# Patient Record
Sex: FEMALE | Race: WHITE | Employment: OTHER | URBAN - METROPOLITAN AREA
[De-identification: names, ages, dates, MRNs, and addresses within clinical notes are randomized per-mention and may not be internally consistent; named-entity substitution may affect disease eponyms.]

---

## 2017-01-26 ENCOUNTER — NURSE ONLY (OUTPATIENT)
Dept: INTERNAL MEDICINE CLINIC | Facility: CLINIC | Age: 82
End: 2017-01-26

## 2017-01-26 DIAGNOSIS — I48.91 ATRIAL FIBRILLATION, UNSPECIFIED TYPE (HCC): Primary | ICD-10-CM

## 2017-01-26 LAB — INR: 2.4 (ref 0.8–1.2)

## 2017-01-26 PROCEDURE — 85610 PROTHROMBIN TIME: CPT | Performed by: INTERNAL MEDICINE

## 2017-02-13 ENCOUNTER — TELEPHONE (OUTPATIENT)
Dept: INTERNAL MEDICINE CLINIC | Facility: CLINIC | Age: 82
End: 2017-02-13

## 2017-02-13 ENCOUNTER — OFFICE VISIT (OUTPATIENT)
Dept: INTERNAL MEDICINE CLINIC | Facility: CLINIC | Age: 82
End: 2017-02-13

## 2017-02-13 VITALS
HEART RATE: 82 BPM | SYSTOLIC BLOOD PRESSURE: 140 MMHG | RESPIRATION RATE: 16 BRPM | HEIGHT: 63.15 IN | DIASTOLIC BLOOD PRESSURE: 70 MMHG | WEIGHT: 124.69 LBS | BODY MASS INDEX: 22.09 KG/M2 | OXYGEN SATURATION: 97 %

## 2017-02-13 DIAGNOSIS — I10 ESSENTIAL HYPERTENSION, BENIGN: ICD-10-CM

## 2017-02-13 DIAGNOSIS — I48.91 ATRIAL FIBRILLATION, UNSPECIFIED TYPE (HCC): ICD-10-CM

## 2017-02-13 DIAGNOSIS — R20.2 RIGHT HAND PARESTHESIA: Primary | ICD-10-CM

## 2017-02-13 PROCEDURE — 99213 OFFICE O/P EST LOW 20 MIN: CPT | Performed by: INTERNAL MEDICINE

## 2017-02-13 NOTE — PROGRESS NOTES
Patient received a phone call from hospital nurse is a follow-up call. Patient complains of tingling right hand. Advised her to make an appointment. Patient states she has intermittent tingling only when she moves fingers.   Denies any weakness in the ha

## 2017-02-14 ENCOUNTER — TELEPHONE (OUTPATIENT)
Dept: INTERNAL MEDICINE CLINIC | Facility: CLINIC | Age: 82
End: 2017-02-14
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
## 2017-02-14 NOTE — PROGRESS NOTES
Son give us a call I did update them on mom's office visit. Did recommend she follow-up with her primary care physician.

## 2017-04-26 ENCOUNTER — NURSE ONLY (OUTPATIENT)
Dept: INTERNAL MEDICINE CLINIC | Facility: CLINIC | Age: 82
End: 2017-04-26

## 2017-04-26 DIAGNOSIS — I48.91 ATRIAL FIBRILLATION, UNSPECIFIED TYPE (HCC): Primary | ICD-10-CM

## 2017-04-26 PROCEDURE — 85610 PROTHROMBIN TIME: CPT | Performed by: INTERNAL MEDICINE

## 2017-07-26 ENCOUNTER — NURSE ONLY (OUTPATIENT)
Dept: INTERNAL MEDICINE CLINIC | Facility: CLINIC | Age: 82
End: 2017-07-26

## 2017-07-26 DIAGNOSIS — I48.91 ATRIAL FIBRILLATION, UNSPECIFIED TYPE (HCC): Primary | ICD-10-CM

## 2017-07-26 LAB — INR: 2.7 (ref 0.8–1.2)

## 2017-07-26 PROCEDURE — 85610 PROTHROMBIN TIME: CPT | Performed by: INTERNAL MEDICINE

## 2017-09-12 ENCOUNTER — NURSE ONLY (OUTPATIENT)
Dept: INTERNAL MEDICINE CLINIC | Facility: CLINIC | Age: 82
End: 2017-09-12

## 2017-09-12 DIAGNOSIS — I48.91 ATRIAL FIBRILLATION, UNSPECIFIED TYPE (HCC): Primary | ICD-10-CM

## 2017-09-12 LAB — INR: 2.5 (ref 0.8–1.2)

## 2017-09-12 PROCEDURE — 85610 PROTHROMBIN TIME: CPT | Performed by: INTERNAL MEDICINE

## 2017-10-03 ENCOUNTER — HOSPITAL ENCOUNTER (OUTPATIENT)
Facility: HOSPITAL | Age: 82
Setting detail: OBSERVATION
Discharge: HOME HEALTH | End: 2017-10-05
Attending: EMERGENCY MEDICINE | Admitting: HOSPITALIST
Payer: MEDICARE

## 2017-10-03 ENCOUNTER — APPOINTMENT (OUTPATIENT)
Dept: GENERAL RADIOLOGY | Facility: HOSPITAL | Age: 82
End: 2017-10-03
Payer: MEDICARE

## 2017-10-03 DIAGNOSIS — R07.9 ACUTE CHEST PAIN: Primary | ICD-10-CM

## 2017-10-03 DIAGNOSIS — I48.20 CHRONIC ATRIAL FIBRILLATION (HCC): ICD-10-CM

## 2017-10-03 PROCEDURE — 71020 XR CHEST PA + LAT CHEST (CPT=71020): CPT | Performed by: EMERGENCY MEDICINE

## 2017-10-03 PROCEDURE — 71020 XR CHEST PA + LAT CHEST (CPT=71020): CPT

## 2017-10-03 RX ORDER — SODIUM CHLORIDE 9 MG/ML
INJECTION, SOLUTION INTRAVENOUS CONTINUOUS
Status: CANCELLED | OUTPATIENT
Start: 2017-10-03 | End: 2017-10-04

## 2017-10-03 RX ORDER — IPRATROPIUM BROMIDE AND ALBUTEROL SULFATE 2.5; .5 MG/3ML; MG/3ML
3 SOLUTION RESPIRATORY (INHALATION) ONCE
Status: COMPLETED | OUTPATIENT
Start: 2017-10-03 | End: 2017-10-03

## 2017-10-03 RX ORDER — ASPIRIN 81 MG/1
324 TABLET, CHEWABLE ORAL ONCE
Status: COMPLETED | OUTPATIENT
Start: 2017-10-03 | End: 2017-10-03

## 2017-10-04 PROCEDURE — 99220 INITIAL OBSERVATION CARE,LEVL III: CPT | Performed by: HOSPITALIST

## 2017-10-04 RX ORDER — DOCUSATE SODIUM 100 MG/1
100 CAPSULE, LIQUID FILLED ORAL 2 TIMES DAILY
Status: DISCONTINUED | OUTPATIENT
Start: 2017-10-04 | End: 2017-10-05

## 2017-10-04 RX ORDER — WARFARIN SODIUM 2.5 MG/1
2.5 TABLET ORAL NIGHTLY
Status: DISCONTINUED | OUTPATIENT
Start: 2017-10-05 | End: 2017-10-04

## 2017-10-04 RX ORDER — NITROGLYCERIN 0.4 MG/1
0.4 TABLET SUBLINGUAL EVERY 5 MIN PRN
Status: DISCONTINUED | OUTPATIENT
Start: 2017-10-04 | End: 2017-10-05

## 2017-10-04 RX ORDER — BISACODYL 10 MG
10 SUPPOSITORY, RECTAL RECTAL
Status: DISCONTINUED | OUTPATIENT
Start: 2017-10-04 | End: 2017-10-05

## 2017-10-04 RX ORDER — WARFARIN SODIUM 2.5 MG/1
2.5 TABLET ORAL NIGHTLY
Status: DISCONTINUED | OUTPATIENT
Start: 2017-10-04 | End: 2017-10-04

## 2017-10-04 RX ORDER — POTASSIUM CHLORIDE 20 MEQ/1
40 TABLET, EXTENDED RELEASE ORAL EVERY 4 HOURS
Status: COMPLETED | OUTPATIENT
Start: 2017-10-04 | End: 2017-10-04

## 2017-10-04 RX ORDER — FUROSEMIDE 10 MG/ML
20 INJECTION INTRAMUSCULAR; INTRAVENOUS ONCE
Status: COMPLETED | OUTPATIENT
Start: 2017-10-04 | End: 2017-10-04

## 2017-10-04 RX ORDER — TORSEMIDE 20 MG/1
20 TABLET ORAL DAILY
Status: DISCONTINUED | OUTPATIENT
Start: 2017-10-05 | End: 2017-10-05

## 2017-10-04 RX ORDER — HYDROCODONE BITARTRATE AND ACETAMINOPHEN 5; 325 MG/1; MG/1
2 TABLET ORAL EVERY 4 HOURS PRN
Status: DISCONTINUED | OUTPATIENT
Start: 2017-10-04 | End: 2017-10-05

## 2017-10-04 RX ORDER — CODEINE PHOSPHATE AND GUAIFENESIN 10; 100 MG/5ML; MG/5ML
5 SOLUTION ORAL EVERY 4 HOURS PRN
Status: DISCONTINUED | OUTPATIENT
Start: 2017-10-04 | End: 2017-10-05

## 2017-10-04 RX ORDER — WARFARIN SODIUM 2.5 MG/1
2.5 TABLET ORAL NIGHTLY
Status: DISCONTINUED | OUTPATIENT
Start: 2017-10-04 | End: 2017-10-05

## 2017-10-04 RX ORDER — FUROSEMIDE 10 MG/ML
20 INJECTION INTRAMUSCULAR; INTRAVENOUS DAILY
Status: DISCONTINUED | OUTPATIENT
Start: 2017-10-05 | End: 2017-10-04

## 2017-10-04 RX ORDER — WARFARIN SODIUM 5 MG/1
5 TABLET ORAL ONCE
Status: COMPLETED | OUTPATIENT
Start: 2017-10-04 | End: 2017-10-04

## 2017-10-04 RX ORDER — ENOXAPARIN SODIUM 100 MG/ML
1 INJECTION SUBCUTANEOUS DAILY
Status: DISCONTINUED | OUTPATIENT
Start: 2017-10-04 | End: 2017-10-05

## 2017-10-04 RX ORDER — CARVEDILOL 6.25 MG/1
6.25 TABLET ORAL DAILY
Status: DISCONTINUED | OUTPATIENT
Start: 2017-10-04 | End: 2017-10-05

## 2017-10-04 RX ORDER — ENOXAPARIN SODIUM 100 MG/ML
30 INJECTION SUBCUTANEOUS DAILY
Status: DISCONTINUED | OUTPATIENT
Start: 2017-10-04 | End: 2017-10-04

## 2017-10-04 RX ORDER — ONDANSETRON 2 MG/ML
4 INJECTION INTRAMUSCULAR; INTRAVENOUS EVERY 6 HOURS PRN
Status: DISCONTINUED | OUTPATIENT
Start: 2017-10-04 | End: 2017-10-05

## 2017-10-04 RX ORDER — HYDROCODONE BITARTRATE AND ACETAMINOPHEN 5; 325 MG/1; MG/1
1 TABLET ORAL EVERY 4 HOURS PRN
Status: DISCONTINUED | OUTPATIENT
Start: 2017-10-04 | End: 2017-10-05

## 2017-10-04 RX ORDER — ACETAMINOPHEN 325 MG/1
650 TABLET ORAL EVERY 4 HOURS PRN
Status: DISCONTINUED | OUTPATIENT
Start: 2017-10-04 | End: 2017-10-05

## 2017-10-04 RX ORDER — POLYETHYLENE GLYCOL 3350 17 G/17G
17 POWDER, FOR SOLUTION ORAL DAILY PRN
Status: DISCONTINUED | OUTPATIENT
Start: 2017-10-04 | End: 2017-10-05

## 2017-10-04 RX ORDER — SODIUM PHOSPHATE, DIBASIC AND SODIUM PHOSPHATE, MONOBASIC 7; 19 G/133ML; G/133ML
1 ENEMA RECTAL ONCE AS NEEDED
Status: DISCONTINUED | OUTPATIENT
Start: 2017-10-04 | End: 2017-10-05

## 2017-10-04 NOTE — H&P
[facility]  History and Physical     Teri Mouse Patient Status:  Observation    3/18/1926 MRN [id number]   [facility] Att[facility] Jona Flynn MD   Hosp Day # 0 PCP Bola Espinal MD     Chief Complaint: Chest pain Current Outpatient Prescriptions on File Prior to Encounter:  Warfarin Sodium 5 MG Oral Tab Take 5 mg by mouth twice a week. Disp:  Rfl:    carvedilol (COREG) 6.25 MG Oral Tab Take 6.25 mg by mouth daily.    Disp:  Rfl: 0   torsemide (DEMADEX) 20 MG Oral Imaging: Imaging data reviewed in Epic. ASSESSMENT / PLAN:     1. Acute on chronic diastolic heart failure   1. IV lasix in am   2. Cardiology  3. Hold on echo for now  2. Afib, hx CVA  1.  Coumadin   2. inr 1.3, lovenox , 5mg coumadin tonight

## 2017-10-04 NOTE — PHYSICAL THERAPY NOTE
PHYSICAL THERAPY QUICK EVALUATION - INPATIENT    Room Number: 1326/4027-M  Evaluation Date: 10/4/2017  Presenting Problem: Chest Pain  Physician Order: PT Eval and Treat    Problem List  Principal Problem:    Acute chest pain      Past Medical History  P Women: 10   70-72 y/o Men: 15, Women: 7   73-73 y/o Men: 6, Women: 5   80-79 y/o Men: 8, Women: 5   80-79 y/o Men: 6, Women: 8   80-79 y/o Men 7, Women: 4]    4 Item Dynamic Gait Index Score: 9/12 Fall Risk: yes  [Scores of 10 or less indicate increase recovers, can continue to walk. (0)  Severe Impairment: Preform task with severe disruption of gait, i.e., staggers outside 15” path, loses balance, stops, reaches for wall.     4. Gait with vertical head turns: 2  Grading: Drew the lowest category that ap Provided: The pt was approached for therapy lying supine in bed. Pt completed supine>sit to EOB with independence. Pt completed sit>stand with independence. Pt ambulated 300 feet with SBA, no loss of balance noted but pt does exhibited notable drifting. ...    Patient was able to transfer Safely and independently   Patient able to ambulate on level surfaces At previous, functional level

## 2017-10-04 NOTE — PROGRESS NOTES
Addendum  Patient seen and examined  Chest: diminished in bases B/L  CVS: S1, S2, RRR  ABD: Soft, NT, ND, BS+  EXT: No c/c    Imaging: Reviewed  Agree with above  Start abx for UTI  Cont diuresis  Increased coumadin  INR in AM  DC planning hopefully tomorr

## 2017-10-04 NOTE — ED PROVIDER NOTES
Patient Seen in: [facility] Emergency Department    History   Patient presents with:  Chest Pain Angina (cardiovascular)    Stated Complaint: cp     HPI    80-year-old female complaining of chest pain the patient's had some intermittent chest pain t Exam    The patient's alert and oriented ×3 no acute distress HEENT exam within normal limits neck is no lymphadenopathy or JVD lungs there is a slight expiratory rhonchi cardiovascular exam shows irregularly irregular rhythm without murmurs  Abdomen is so -----------         ------                     CBC W/ DIFFERENTIAL[[id number]]          Abnormal            Final result                 Please view results for these tests on the individual orders.      EKG    Rate, intervals and

## 2017-10-04 NOTE — PLAN OF CARE
CARDIOVASCULAR - ADULT    • Maintains optimal cardiac output and hemodynamic stability Progressing    • Absence of cardiac arrhythmias or at baseline Progressing                Assumed pt care at 0730. Alert, awake, very forgetful.  Breathing unlabored at

## 2017-10-04 NOTE — HISTORICAL OFFICE NOTE
Ameena Fredas  [phone]  : 1926  ACCOUNT:  [id number]  PCP: Dr. Manzanares Dorosemary: Shad Bhandari:  2015    DISCHARGE SUMMARY    HOSPITAL COURSE: Mrs. Cassia Arellano is an 24-year-old female who lives at [facility] and is follow

## 2017-10-04 NOTE — CONSULTS
[facility]  Report of Consultation    Tanvir Sanabria Patient Status:  Observation    3/18/1926 MRN [id number]   [facility] 2NE-A Attending Og Renee MD   Hosp Day # 0 PCP Heather Javier MD     Reason for Consultation:  Chest pain Right ventricle: The cavity size was normal. Systolic function was     normal.  5. Right atrium: The atrium was massively dilated. 6. Tricuspid valve: There was mild-moderate regurgitation.   7. Pulmonary arteries: Systolic pressure was mildly elevated est powder packet 17 g, 17 g, Oral, Daily PRN  •  bisacodyl (DULCOLAX) rectal suppository 10 mg, 10 mg, Rectal, Daily PRN  •  FLEET ENEMA (FLEET) 7-19 GM/118ML enema 133 mL, 1 enema, Rectal, Once PRN  •  [START ON 10/5/2017] Warfarin Sodium (COUMADIN) tab 2.5 pulmonary vascularity. Tortuous and calcified aorta. Perihilar and upper lobe interstitial opacities are greater on the right and not significantly changed when allowing for technical differences and therefore is consistent with fibrosis.  No new pulmonary dose of coumadin today, not necessary bridging due to h/o GI bleed 2015 and her advanced age  - may give empirically one dose of IV Lasix then usual oral torsemide dose  - continue Coreg  - PT eval - uses a walker PRN - occasional dizziness  - DNR  - obser

## 2017-10-04 NOTE — CM/SW NOTE
10/04/17 1430   CM/SW Referral Data   Referral Source Physician   Reason for Referral Discharge planning   Informant Patient   Pertinent Medical Hx   Primary Care Physician Name Dr Gay Quiñones   Patient Info   Patient's Mental Status Alert;Oriented;Memory Impa

## 2017-10-04 NOTE — PLAN OF CARE
NURSING ADMISSION NOTE      Patient admitted via stretcher    Oriented to room. Safety precautions initiated. Bed in low position. Call light in reach. Pt received from ER axox4, forgetful, poor historian. Denies chest pain or sob.  Med rec complete

## 2017-10-04 NOTE — ED NOTES
Assumed care of patient from [facility]. Pt resting comfortably, denies c/o at this time.  NAD

## 2017-10-04 NOTE — PROGRESS NOTES
[facility] Note:  Renal Dose Adjustment for Enoxaparin (LOVENOX)    Tanvir Sanabria has been prescribed Enoxaparin (LOVENOX) 40 mg subcutaneously every 24 hours. Estimated Creatinine Clearance: 23 mL/min (based on SCr of 1.21 mg/dL (H)).     Her calcul

## 2017-10-05 VITALS
RESPIRATION RATE: 18 BRPM | WEIGHT: 104.94 LBS | SYSTOLIC BLOOD PRESSURE: 98 MMHG | TEMPERATURE: 98 F | HEART RATE: 60 BPM | BODY MASS INDEX: 19 KG/M2 | DIASTOLIC BLOOD PRESSURE: 30 MMHG | OXYGEN SATURATION: 92 %

## 2017-10-05 PROCEDURE — 99217 OBSERVATION CARE DISCHARGE: CPT | Performed by: HOSPITALIST

## 2017-10-05 RX ORDER — CEFUROXIME AXETIL 250 MG/1
250 TABLET ORAL 2 TIMES DAILY
Qty: 6 TABLET | Refills: 0 | Status: SHIPPED | OUTPATIENT
Start: 2017-10-05 | End: 2017-10-08

## 2017-10-05 NOTE — OCCUPATIONAL THERAPY NOTE
IP OT attempt to see patient for therapeutic treatment. RN Simón Nickerson) states that patient has been DC from [address] [place] [address], is dressed to leave and is waiting to be picked up. Pt DC from IP OT services at this time.     TEVIN Price, OTR/L  Master of Occupational T

## 2017-10-05 NOTE — CM/SW NOTE
SW spoke with pt's son regarding d/c plan. Pt's son requested referral to [address] and stated he arranged for caregiver services thru the same agency. [facility] referral sent. Pt will d/c today.

## 2017-10-05 NOTE — DISCHARGE SUMMARY
[facility]  DISCHARGE SUMMARY     Etowah Medico Patient Status:  Observation    3/18/1926 MRN [id number]   [facility] 2NE-A Attending Marychuy Banuelos MD   Hosp Day # 0 PCP Zonia Mehta MD     Date of Admission: 10/3/2017  Date of cardiology and discharged in stable condition.     Procedures during hospitalization:   • None     Incidental or significant findings and recommendations (brief descriptions):  • Per Brief Synopsis of Hospital Course    Lab/Test results pending at Discharge No murmurs, rubs or gallops. Abdomen: Soft, nontender, nondistended. Positive bowel sounds. No rebound or guarding. Extremities: No edema.   -----------------------------------------------------------------------------------------------  PATIENT DISCHAR

## 2017-10-05 NOTE — PROGRESS NOTES
[facility]  Cardiology Progress Note    Lige Paci Patient Status:  Observation    3/18/1926 MRN [id number]   [facility] 2NE-A Attending Elian Clay MD   Hosp Day # 0 PCP Lisa Saldivar MD     Subjective:  She really wants to go sodium  100 mg Oral BID   • enoxaparin  1 mg/kg Subcutaneous Daily   • torsemide  20 mg Oral Daily   • cefTRIAXone  1 g Intravenous Q24H   • Warfarin Sodium  2.5 mg Oral Nightly       Diagnostics:     Physical Exam:  General:  Alert, in no apparent distres

## 2017-10-05 NOTE — PLAN OF CARE
Assumed care of patient at 1. A/o x2-3 but very forgetful  Son at bedside and updated. non productive cough. Bed alarm on. Warfarin order clarified with pharmacy who will contact hospitalist to clarify.

## 2017-10-05 NOTE — PROGRESS NOTES
ALERT AND DENIES C/O PAIN. [place] ON BEDSIDE,DC INSTRUCTION GIVEN-VERBALIZED UNDERSTANDING,DC TELE,DC HL.VERBALIZED UNDERSTANDING.

## 2017-10-06 ENCOUNTER — NURSE ONLY (OUTPATIENT)
Dept: INTERNAL MEDICINE CLINIC | Facility: CLINIC | Age: 82
End: 2017-10-06

## 2017-10-06 DIAGNOSIS — I48.20 CHRONIC ATRIAL FIBRILLATION (HCC): Primary | ICD-10-CM

## 2017-10-06 PROCEDURE — 85610 PROTHROMBIN TIME: CPT | Performed by: INTERNAL MEDICINE

## 2017-10-06 NOTE — CM/SW NOTE
Pt d/c 10/05 with [id number].       10/06/17 1000   Discharge disposition   Discharged to: Home or Self   Name of Kle[facility]any at [facility] after discharge Skilled home care;Employed caregiver

## 2017-11-03 ENCOUNTER — NURSE ONLY (OUTPATIENT)
Dept: INTERNAL MEDICINE CLINIC | Facility: CLINIC | Age: 82
End: 2017-11-03

## 2017-11-03 DIAGNOSIS — I48.20 CHRONIC ATRIAL FIBRILLATION (HCC): Primary | ICD-10-CM

## 2017-11-03 PROCEDURE — 85610 PROTHROMBIN TIME: CPT | Performed by: INTERNAL MEDICINE

## 2017-12-08 ENCOUNTER — NURSE ONLY (OUTPATIENT)
Dept: INTERNAL MEDICINE CLINIC | Facility: CLINIC | Age: 82
End: 2017-12-08

## 2017-12-08 DIAGNOSIS — I48.20 CHRONIC ATRIAL FIBRILLATION (HCC): Primary | ICD-10-CM

## 2017-12-08 PROCEDURE — 85610 PROTHROMBIN TIME: CPT | Performed by: INTERNAL MEDICINE

## 2017-12-29 ENCOUNTER — NURSE ONLY (OUTPATIENT)
Dept: INTERNAL MEDICINE CLINIC | Facility: CLINIC | Age: 82
End: 2017-12-29

## 2017-12-29 DIAGNOSIS — I48.20 CHRONIC ATRIAL FIBRILLATION (HCC): Primary | ICD-10-CM

## 2017-12-29 PROCEDURE — 85610 PROTHROMBIN TIME: CPT | Performed by: INTERNAL MEDICINE

## 2018-01-01 ENCOUNTER — APPOINTMENT (OUTPATIENT)
Dept: GENERAL RADIOLOGY | Facility: HOSPITAL | Age: 83
End: 2018-01-01
Attending: EMERGENCY MEDICINE
Payer: MEDICARE

## 2018-01-01 ENCOUNTER — APPOINTMENT (OUTPATIENT)
Dept: GENERAL RADIOLOGY | Facility: HOSPITAL | Age: 83
DRG: 483 | End: 2018-01-01
Attending: HOSPITALIST
Payer: MEDICARE

## 2018-01-01 ENCOUNTER — MED REC SCAN ONLY (OUTPATIENT)
Dept: INTERNAL MEDICINE CLINIC | Facility: CLINIC | Age: 83
End: 2018-01-01

## 2018-01-01 ENCOUNTER — TELEPHONE (OUTPATIENT)
Dept: INTERNAL MEDICINE CLINIC | Facility: CLINIC | Age: 83
End: 2018-01-01

## 2018-01-01 ENCOUNTER — HOSPITAL ENCOUNTER (EMERGENCY)
Facility: HOSPITAL | Age: 83
Discharge: NURSING, REHAB OR LONG-TERM CARE | End: 2018-01-01
Attending: EMERGENCY MEDICINE
Payer: MEDICARE

## 2018-01-01 ENCOUNTER — ANESTHESIA (OUTPATIENT)
Dept: SURGERY | Facility: HOSPITAL | Age: 83
DRG: 483 | End: 2018-01-01
Payer: MEDICARE

## 2018-01-01 ENCOUNTER — NURSE ONLY (OUTPATIENT)
Dept: LAB | Age: 83
End: 2018-01-01
Attending: INTERNAL MEDICINE
Payer: MEDICARE

## 2018-01-01 ENCOUNTER — HOSPITAL ENCOUNTER (OUTPATIENT)
Facility: HOSPITAL | Age: 83
Setting detail: OBSERVATION
Discharge: NURSING, REHAB OR LONG-TERM CARE | End: 2018-01-01
Attending: EMERGENCY MEDICINE | Admitting: HOSPITALIST
Payer: MEDICARE

## 2018-01-01 ENCOUNTER — SNF ADMIT/H&P (OUTPATIENT)
Dept: INTERNAL MEDICINE CLINIC | Facility: CLINIC | Age: 83
End: 2018-01-01

## 2018-01-01 ENCOUNTER — APPOINTMENT (OUTPATIENT)
Dept: CT IMAGING | Facility: HOSPITAL | Age: 83
End: 2018-01-01
Attending: EMERGENCY MEDICINE
Payer: MEDICARE

## 2018-01-01 ENCOUNTER — SNF VISIT (OUTPATIENT)
Dept: INTERNAL MEDICINE CLINIC | Facility: CLINIC | Age: 83
End: 2018-01-01

## 2018-01-01 ENCOUNTER — APPOINTMENT (OUTPATIENT)
Dept: ULTRASOUND IMAGING | Facility: HOSPITAL | Age: 83
DRG: 292 | End: 2018-01-01
Attending: EMERGENCY MEDICINE
Payer: MEDICARE

## 2018-01-01 ENCOUNTER — APPOINTMENT (OUTPATIENT)
Dept: CV DIAGNOSTICS | Facility: HOSPITAL | Age: 83
End: 2018-01-01
Attending: NURSE PRACTITIONER
Payer: MEDICARE

## 2018-01-01 ENCOUNTER — APPOINTMENT (OUTPATIENT)
Dept: GENERAL RADIOLOGY | Facility: HOSPITAL | Age: 83
End: 2018-01-01
Payer: MEDICARE

## 2018-01-01 ENCOUNTER — OFFICE VISIT (OUTPATIENT)
Dept: INTERNAL MEDICINE CLINIC | Facility: CLINIC | Age: 83
End: 2018-01-01
Payer: MEDICARE

## 2018-01-01 ENCOUNTER — HOSPITAL ENCOUNTER (INPATIENT)
Facility: HOSPITAL | Age: 83
LOS: 3 days | Discharge: NURSING, REHAB OR LONG-TERM CARE | DRG: 483 | End: 2018-01-01
Attending: ORTHOPAEDIC SURGERY | Admitting: ORTHOPAEDIC SURGERY
Payer: MEDICARE

## 2018-01-01 ENCOUNTER — SURGERY (OUTPATIENT)
Age: 83
End: 2018-01-01

## 2018-01-01 ENCOUNTER — APPOINTMENT (OUTPATIENT)
Dept: GENERAL RADIOLOGY | Facility: HOSPITAL | Age: 83
DRG: 292 | End: 2018-01-01
Attending: EMERGENCY MEDICINE
Payer: MEDICARE

## 2018-01-01 ENCOUNTER — HOSPITAL ENCOUNTER (INPATIENT)
Facility: HOSPITAL | Age: 83
LOS: 2 days | Discharge: NURSING, REHAB OR LONG-TERM CARE | DRG: 292 | End: 2018-01-01
Attending: EMERGENCY MEDICINE | Admitting: HOSPITALIST
Payer: MEDICARE

## 2018-01-01 ENCOUNTER — ANESTHESIA EVENT (OUTPATIENT)
Dept: SURGERY | Facility: HOSPITAL | Age: 83
DRG: 483 | End: 2018-01-01
Payer: MEDICARE

## 2018-01-01 ENCOUNTER — APPOINTMENT (OUTPATIENT)
Dept: GENERAL RADIOLOGY | Facility: HOSPITAL | Age: 83
DRG: 483 | End: 2018-01-01
Attending: ORTHOPAEDIC SURGERY
Payer: MEDICARE

## 2018-01-01 ENCOUNTER — APPOINTMENT (OUTPATIENT)
Dept: ULTRASOUND IMAGING | Facility: HOSPITAL | Age: 83
End: 2018-01-01
Attending: INTERNAL MEDICINE
Payer: MEDICARE

## 2018-01-01 ENCOUNTER — LAB ENCOUNTER (OUTPATIENT)
Dept: LAB | Age: 83
DRG: 483 | End: 2018-01-01
Attending: INTERNAL MEDICINE
Payer: MEDICARE

## 2018-01-01 ENCOUNTER — APPOINTMENT (OUTPATIENT)
Dept: GENERAL RADIOLOGY | Facility: HOSPITAL | Age: 83
End: 2018-01-01
Attending: INTERNAL MEDICINE
Payer: MEDICARE

## 2018-01-01 ENCOUNTER — APPOINTMENT (OUTPATIENT)
Dept: CT IMAGING | Facility: HOSPITAL | Age: 83
DRG: 292 | End: 2018-01-01
Attending: EMERGENCY MEDICINE
Payer: MEDICARE

## 2018-01-01 VITALS
SYSTOLIC BLOOD PRESSURE: 126 MMHG | HEART RATE: 65 BPM | OXYGEN SATURATION: 92 % | TEMPERATURE: 99 F | BODY MASS INDEX: 19 KG/M2 | DIASTOLIC BLOOD PRESSURE: 46 MMHG | RESPIRATION RATE: 18 BRPM | WEIGHT: 112.63 LBS

## 2018-01-01 VITALS
HEART RATE: 50 BPM | RESPIRATION RATE: 18 BRPM | SYSTOLIC BLOOD PRESSURE: 124 MMHG | DIASTOLIC BLOOD PRESSURE: 39 MMHG | WEIGHT: 104.06 LBS | BODY MASS INDEX: 17 KG/M2 | TEMPERATURE: 98 F | OXYGEN SATURATION: 97 %

## 2018-01-01 VITALS
DIASTOLIC BLOOD PRESSURE: 59 MMHG | HEIGHT: 65 IN | TEMPERATURE: 97 F | OXYGEN SATURATION: 95 % | WEIGHT: 107 LBS | RESPIRATION RATE: 18 BRPM | BODY MASS INDEX: 17.83 KG/M2 | HEART RATE: 60 BPM | SYSTOLIC BLOOD PRESSURE: 140 MMHG

## 2018-01-01 VITALS
RESPIRATION RATE: 16 BRPM | SYSTOLIC BLOOD PRESSURE: 116 MMHG | HEIGHT: 66 IN | HEART RATE: 60 BPM | WEIGHT: 112.44 LBS | TEMPERATURE: 98 F | OXYGEN SATURATION: 97 % | DIASTOLIC BLOOD PRESSURE: 56 MMHG | BODY MASS INDEX: 18.07 KG/M2

## 2018-01-01 VITALS
HEART RATE: 56 BPM | SYSTOLIC BLOOD PRESSURE: 80 MMHG | RESPIRATION RATE: 16 BRPM | DIASTOLIC BLOOD PRESSURE: 56 MMHG | OXYGEN SATURATION: 93 %

## 2018-01-01 VITALS
DIASTOLIC BLOOD PRESSURE: 53 MMHG | BODY MASS INDEX: 16.51 KG/M2 | WEIGHT: 99.13 LBS | HEIGHT: 65 IN | SYSTOLIC BLOOD PRESSURE: 106 MMHG | TEMPERATURE: 98 F | OXYGEN SATURATION: 92 % | RESPIRATION RATE: 20 BRPM | HEART RATE: 68 BPM

## 2018-01-01 DIAGNOSIS — S42.291A OTHER CLOSED DISPLACED FRACTURE OF PROXIMAL END OF RIGHT HUMERUS, INITIAL ENCOUNTER: Primary | ICD-10-CM

## 2018-01-01 DIAGNOSIS — R35.0 URINE FREQUENCY: Primary | ICD-10-CM

## 2018-01-01 DIAGNOSIS — D64.9 ANEMIA, UNSPECIFIED TYPE: ICD-10-CM

## 2018-01-01 DIAGNOSIS — I50.22 CHRONIC SYSTOLIC CONGESTIVE HEART FAILURE (HCC): ICD-10-CM

## 2018-01-01 DIAGNOSIS — I50.22 CHRONIC SYSTOLIC CONGESTIVE HEART FAILURE (HCC): Primary | ICD-10-CM

## 2018-01-01 DIAGNOSIS — N30.01 ACUTE CYSTITIS WITH HEMATURIA: ICD-10-CM

## 2018-01-01 DIAGNOSIS — J18.9 COMMUNITY ACQUIRED PNEUMONIA OF RIGHT UPPER LOBE OF LUNG: ICD-10-CM

## 2018-01-01 DIAGNOSIS — G45.9 TIA (TRANSIENT ISCHEMIC ATTACK): ICD-10-CM

## 2018-01-01 DIAGNOSIS — R41.3 MEMORY DEFICIT: ICD-10-CM

## 2018-01-01 DIAGNOSIS — S42.201A: Primary | ICD-10-CM

## 2018-01-01 DIAGNOSIS — I48.20 CHRONIC ATRIAL FIBRILLATION (HCC): ICD-10-CM

## 2018-01-01 DIAGNOSIS — K59.03 DRUG-INDUCED CONSTIPATION: ICD-10-CM

## 2018-01-01 DIAGNOSIS — I48.20 CHRONIC ATRIAL FIBRILLATION (HCC): Primary | ICD-10-CM

## 2018-01-01 DIAGNOSIS — I27.20 PULMONARY HTN (HCC): ICD-10-CM

## 2018-01-01 DIAGNOSIS — F22 PARANOID BEHAVIOR (HCC): ICD-10-CM

## 2018-01-01 DIAGNOSIS — R06.02 SHORTNESS OF BREATH: ICD-10-CM

## 2018-01-01 DIAGNOSIS — R53.1 RIGHT SIDED WEAKNESS: ICD-10-CM

## 2018-01-01 DIAGNOSIS — I50.43 ACUTE ON CHRONIC COMBINED SYSTOLIC AND DIASTOLIC CONGESTIVE HEART FAILURE (HCC): Primary | ICD-10-CM

## 2018-01-01 DIAGNOSIS — R42 DIZZY: Primary | ICD-10-CM

## 2018-01-01 DIAGNOSIS — Z00.00 ROUTINE GENERAL MEDICAL EXAMINATION AT A HEALTH CARE FACILITY: Primary | ICD-10-CM

## 2018-01-01 DIAGNOSIS — R09.89 PULMONARY CONGESTION: Primary | ICD-10-CM

## 2018-01-01 DIAGNOSIS — S42.291A OTHER CLOSED DISPLACED FRACTURE OF PROXIMAL END OF RIGHT HUMERUS, INITIAL ENCOUNTER: ICD-10-CM

## 2018-01-01 DIAGNOSIS — I82.451 ACUTE DEEP VEIN THROMBOSIS (DVT) OF RIGHT PERONEAL VEIN (HCC): ICD-10-CM

## 2018-01-01 DIAGNOSIS — R41.0 DISORIENTATION: Primary | ICD-10-CM

## 2018-01-01 DIAGNOSIS — R35.0 URINARY FREQUENCY: Primary | ICD-10-CM

## 2018-01-01 DIAGNOSIS — I95.2 HYPOTENSION DUE TO DRUGS: Primary | ICD-10-CM

## 2018-01-01 DIAGNOSIS — I34.0 NON-RHEUMATIC MITRAL REGURGITATION: Primary | ICD-10-CM

## 2018-01-01 DIAGNOSIS — R69 DIAGNOSIS UNKNOWN: Primary | ICD-10-CM

## 2018-01-01 DIAGNOSIS — I10 ESSENTIAL HYPERTENSION: ICD-10-CM

## 2018-01-01 DIAGNOSIS — G45.9 TIA (TRANSIENT ISCHEMIC ATTACK): Primary | ICD-10-CM

## 2018-01-01 DIAGNOSIS — I50.42 CHRONIC COMBINED SYSTOLIC AND DIASTOLIC CONGESTIVE HEART FAILURE (HCC): ICD-10-CM

## 2018-01-01 DIAGNOSIS — S42.201A CLOSED TRAUMATIC DISPLACED FRACTURE OF PROXIMAL END OF RIGHT HUMERUS, INITIAL ENCOUNTER: Primary | ICD-10-CM

## 2018-01-01 DIAGNOSIS — R82.90 URINE FINDINGS ABNORMAL: ICD-10-CM

## 2018-01-01 DIAGNOSIS — I34.0 NON-RHEUMATIC MITRAL REGURGITATION: ICD-10-CM

## 2018-01-01 LAB
ALBUMIN SERPL-MCNC: 2.9 G/DL (ref 3.5–4.8)
ALBUMIN/GLOB SERPL: 0.8 {RATIO} (ref 1–2)
ALP LIVER SERPL-CCNC: 82 U/L (ref 55–142)
ALT SERPL-CCNC: 15 U/L (ref 14–54)
ANION GAP SERPL CALC-SCNC: 5 MMOL/L (ref 0–18)
ANION GAP SERPL CALC-SCNC: 7 MMOL/L (ref 0–18)
ANION GAP SERPL CALC-SCNC: 7 MMOL/L (ref 0–18)
ANTIBODY SCREEN: NEGATIVE
AST SERPL-CCNC: 19 U/L (ref 15–41)
ATRIAL RATE: 96 BPM
BASOPHILS # BLD AUTO: 0.02 X10(3) UL (ref 0–0.1)
BASOPHILS NFR BLD AUTO: 0.2 %
BASOPHILS NFR BLD AUTO: 0.3 %
BILIRUB SERPL-MCNC: 1.2 MG/DL (ref 0.1–2)
BILIRUB UR QL STRIP.AUTO: NEGATIVE
BUN BLD-MCNC: 24 MG/DL (ref 8–20)
BUN BLD-MCNC: 33 MG/DL (ref 8–20)
BUN BLD-MCNC: 39 MG/DL (ref 8–20)
BUN/CREAT SERPL: 23.1 (ref 10–20)
BUN/CREAT SERPL: 28 (ref 10–20)
BUN/CREAT SERPL: 32 (ref 10–20)
CALCIUM BLD-MCNC: 8.1 MG/DL (ref 8.3–10.3)
CALCIUM BLD-MCNC: 8.4 MG/DL (ref 8.3–10.3)
CALCIUM BLD-MCNC: 8.8 MG/DL (ref 8.3–10.3)
CHLORIDE SERPL-SCNC: 104 MMOL/L (ref 101–111)
CHLORIDE SERPL-SCNC: 105 MMOL/L (ref 101–111)
CHLORIDE SERPL-SCNC: 106 MMOL/L (ref 101–111)
CLARITY UR REFRACT.AUTO: CLEAR
CLARITY UR REFRACT.AUTO: CLEAR
CO2 SERPL-SCNC: 29 MMOL/L (ref 22–32)
CO2 SERPL-SCNC: 29 MMOL/L (ref 22–32)
CO2 SERPL-SCNC: 30 MMOL/L (ref 22–32)
COLOR UR AUTO: YELLOW
CREAT BLD-MCNC: 1.04 MG/DL (ref 0.55–1.02)
CREAT BLD-MCNC: 1.18 MG/DL (ref 0.55–1.02)
CREAT BLD-MCNC: 1.22 MG/DL (ref 0.55–1.02)
EOSINOPHIL # BLD AUTO: 0.38 X10(3) UL (ref 0–0.3)
EOSINOPHIL # BLD AUTO: 0.49 X10(3) UL (ref 0–0.3)
EOSINOPHIL # BLD AUTO: 0.5 X10(3) UL (ref 0–0.3)
EOSINOPHIL # BLD AUTO: 0.7 X10(3) UL (ref 0–0.3)
EOSINOPHIL NFR BLD AUTO: 4.4 %
EOSINOPHIL NFR BLD AUTO: 5.1 %
EOSINOPHIL NFR BLD AUTO: 6.6 %
EOSINOPHIL NFR BLD AUTO: 7.1 %
ERYTHROCYTE [DISTWIDTH] IN BLOOD BY AUTOMATED COUNT: 13.6 % (ref 11.5–16)
ERYTHROCYTE [DISTWIDTH] IN BLOOD BY AUTOMATED COUNT: 13.8 % (ref 11.5–16)
ERYTHROCYTE [DISTWIDTH] IN BLOOD BY AUTOMATED COUNT: 13.9 % (ref 11.5–16)
ERYTHROCYTE [DISTWIDTH] IN BLOOD BY AUTOMATED COUNT: 13.9 % (ref 11.5–16)
ERYTHROCYTE [DISTWIDTH] IN BLOOD BY AUTOMATED COUNT: 14 % (ref 11.5–16)
GLOBULIN PLAS-MCNC: 3.7 G/DL (ref 2.5–3.7)
GLUCOSE BLD-MCNC: 103 MG/DL (ref 70–99)
GLUCOSE BLD-MCNC: 79 MG/DL (ref 70–99)
GLUCOSE BLD-MCNC: 90 MG/DL (ref 70–99)
GLUCOSE UR STRIP.AUTO-MCNC: NEGATIVE MG/DL
HCT VFR BLD AUTO: 27.1 % (ref 34–50)
HCT VFR BLD AUTO: 29.2 % (ref 34–50)
HCT VFR BLD AUTO: 30.2 % (ref 34–50)
HCT VFR BLD AUTO: 30.2 % (ref 34–50)
HCT VFR BLD AUTO: 31.3 % (ref 34–50)
HCT VFR BLD AUTO: 31.3 % (ref 34–50)
HCT VFR BLD AUTO: 34.2 % (ref 34–50)
HGB BLD-MCNC: 10.1 G/DL (ref 12–16)
HGB BLD-MCNC: 10.2 G/DL (ref 12–16)
HGB BLD-MCNC: 10.2 G/DL (ref 12–16)
HGB BLD-MCNC: 10.9 G/DL (ref 12–16)
HGB BLD-MCNC: 8.7 G/DL (ref 12–16)
HGB BLD-MCNC: 9.4 G/DL (ref 12–16)
HGB BLD-MCNC: 9.8 G/DL (ref 12–16)
HGB BLD-MCNC: 9.8 G/DL (ref 12–16)
HYALINE CASTS #/AREA URNS AUTO: PRESENT /LPF
IMMATURE GRANULOCYTE COUNT: 0.02 X10(3) UL (ref 0–1)
IMMATURE GRANULOCYTE COUNT: 0.05 X10(3) UL (ref 0–1)
IMMATURE GRANULOCYTE RATIO %: 0.2 %
IMMATURE GRANULOCYTE RATIO %: 0.2 %
IMMATURE GRANULOCYTE RATIO %: 0.3 %
IMMATURE GRANULOCYTE RATIO %: 0.5 %
KETONES UR STRIP.AUTO-MCNC: NEGATIVE MG/DL
LEUKOCYTE ESTERASE UR QL STRIP.AUTO: NEGATIVE
LEUKOCYTE ESTERASE UR QL STRIP.AUTO: NEGATIVE
LYMPHOCYTES # BLD AUTO: 1 X10(3) UL (ref 0.9–4)
LYMPHOCYTES # BLD AUTO: 1.49 X10(3) UL (ref 0.9–4)
LYMPHOCYTES # BLD AUTO: 1.7 X10(3) UL (ref 0.9–4)
LYMPHOCYTES # BLD AUTO: 2.35 X10(3) UL (ref 0.9–4)
LYMPHOCYTES NFR BLD AUTO: 11.5 %
LYMPHOCYTES NFR BLD AUTO: 15.5 %
LYMPHOCYTES NFR BLD AUTO: 17.3 %
LYMPHOCYTES NFR BLD AUTO: 31.1 %
M PROTEIN MFR SERPL ELPH: 6.6 G/DL (ref 6.1–8.3)
MCH RBC QN AUTO: 29.7 PG (ref 27–33.2)
MCH RBC QN AUTO: 29.8 PG (ref 27–33.2)
MCH RBC QN AUTO: 30.2 PG (ref 27–33.2)
MCH RBC QN AUTO: 30.6 PG (ref 27–33.2)
MCH RBC QN AUTO: 30.6 PG (ref 27–33.2)
MCH RBC QN AUTO: 30.7 PG (ref 27–33.2)
MCH RBC QN AUTO: 30.7 PG (ref 27–33.2)
MCHC RBC AUTO-ENTMCNC: 31.9 G/DL (ref 31–37)
MCHC RBC AUTO-ENTMCNC: 32.1 G/DL (ref 31–37)
MCHC RBC AUTO-ENTMCNC: 32.2 G/DL (ref 31–37)
MCHC RBC AUTO-ENTMCNC: 32.5 G/DL (ref 31–37)
MCHC RBC AUTO-ENTMCNC: 32.5 G/DL (ref 31–37)
MCHC RBC AUTO-ENTMCNC: 32.6 G/DL (ref 31–37)
MCHC RBC AUTO-ENTMCNC: 32.6 G/DL (ref 31–37)
MCV RBC AUTO: 92.4 FL (ref 81–100)
MCV RBC AUTO: 93.4 FL (ref 81–100)
MCV RBC AUTO: 94.1 FL (ref 81–100)
MCV RBC AUTO: 94.3 FL (ref 81–100)
MCV RBC AUTO: 94.3 FL (ref 81–100)
MCV RBC AUTO: 94.4 FL (ref 81–100)
MCV RBC AUTO: 94.4 FL (ref 81–100)
MONOCYTES # BLD AUTO: 1.01 X10(3) UL (ref 0.1–1)
MONOCYTES # BLD AUTO: 1.02 X10(3) UL (ref 0.1–1)
MONOCYTES # BLD AUTO: 1.36 X10(3) UL (ref 0.1–1)
MONOCYTES # BLD AUTO: 1.4 X10(3) UL (ref 0.1–1)
MONOCYTES NFR BLD AUTO: 11.8 %
MONOCYTES NFR BLD AUTO: 13.4 %
MONOCYTES NFR BLD AUTO: 14.2 %
MONOCYTES NFR BLD AUTO: 14.3 %
NEUTROPHIL ABS PRELIM: 3.66 X10 (3) UL (ref 1.3–6.7)
NEUTROPHIL ABS PRELIM: 5.96 X10 (3) UL (ref 1.3–6.7)
NEUTROPHIL ABS PRELIM: 6.19 X10 (3) UL (ref 1.3–6.7)
NEUTROPHIL ABS PRELIM: 6.23 X10 (3) UL (ref 1.3–6.7)
NEUTROPHILS # BLD AUTO: 3.66 X10(3) UL (ref 1.3–6.7)
NEUTROPHILS # BLD AUTO: 5.96 X10(3) UL (ref 1.3–6.7)
NEUTROPHILS # BLD AUTO: 6.19 X10(3) UL (ref 1.3–6.7)
NEUTROPHILS # BLD AUTO: 6.23 X10(3) UL (ref 1.3–6.7)
NEUTROPHILS NFR BLD AUTO: 48.3 %
NEUTROPHILS NFR BLD AUTO: 60.9 %
NEUTROPHILS NFR BLD AUTO: 64.5 %
NEUTROPHILS NFR BLD AUTO: 71.9 %
NITRITE UR QL STRIP.AUTO: NEGATIVE
OSMOLALITY SERPL CALC.SUM OF ELEC: 295 MOSM/KG (ref 275–295)
OSMOLALITY SERPL CALC.SUM OF ELEC: 295 MOSM/KG (ref 275–295)
OSMOLALITY SERPL CALC.SUM OF ELEC: 304 MOSM/KG (ref 275–295)
PH UR STRIP.AUTO: 5 [PH] (ref 4.5–8)
PH UR STRIP.AUTO: 6 [PH] (ref 4.5–8)
PH UR STRIP.AUTO: 6 [PH] (ref 4.5–8)
PLATELET # BLD AUTO: 163 10(3)UL (ref 150–450)
PLATELET # BLD AUTO: 219 10(3)UL (ref 150–450)
PLATELET # BLD AUTO: 219 10(3)UL (ref 150–450)
PLATELET # BLD AUTO: 224 10(3)UL (ref 150–450)
PLATELET # BLD AUTO: 224 10(3)UL (ref 150–450)
PLATELET # BLD AUTO: 236 10(3)UL (ref 150–450)
PLATELET # BLD AUTO: 252 10(3)UL (ref 150–450)
POTASSIUM SERPL-SCNC: 3.6 MMOL/L (ref 3.6–5.1)
POTASSIUM SERPL-SCNC: 4 MMOL/L (ref 3.6–5.1)
POTASSIUM SERPL-SCNC: 4.2 MMOL/L (ref 3.6–5.1)
POTASSIUM SERPL-SCNC: 4.2 MMOL/L (ref 3.6–5.1)
PROT UR STRIP.AUTO-MCNC: NEGATIVE MG/DL
Q-T INTERVAL: 404 MS
QRS DURATION: 118 MS
QTC CALCULATION (BEZET): 483 MS
R AXIS: 88 DEGREES
RBC # BLD AUTO: 2.88 X10(6)UL (ref 3.8–5.1)
RBC # BLD AUTO: 3.16 X10(6)UL (ref 3.8–5.1)
RBC # BLD AUTO: 3.2 X10(6)UL (ref 3.8–5.1)
RBC # BLD AUTO: 3.2 X10(6)UL (ref 3.8–5.1)
RBC # BLD AUTO: 3.32 X10(6)UL (ref 3.8–5.1)
RBC # BLD AUTO: 3.32 X10(6)UL (ref 3.8–5.1)
RBC # BLD AUTO: 3.66 X10(6)UL (ref 3.8–5.1)
RBC UR QL AUTO: NEGATIVE
RBC UR QL AUTO: NEGATIVE
RED CELL DISTRIBUTION WIDTH-SD: 46.5 FL (ref 35.1–46.3)
RED CELL DISTRIBUTION WIDTH-SD: 47.1 FL (ref 35.1–46.3)
RED CELL DISTRIBUTION WIDTH-SD: 47.4 FL (ref 35.1–46.3)
RED CELL DISTRIBUTION WIDTH-SD: 48.1 FL (ref 35.1–46.3)
RED CELL DISTRIBUTION WIDTH-SD: 48.1 FL (ref 35.1–46.3)
RED CELL DISTRIBUTION WIDTH-SD: 48.2 FL (ref 35.1–46.3)
RED CELL DISTRIBUTION WIDTH-SD: 48.2 FL (ref 35.1–46.3)
RH BLOOD TYPE: NEGATIVE
SODIUM SERPL-SCNC: 138 MMOL/L (ref 136–144)
SODIUM SERPL-SCNC: 141 MMOL/L (ref 136–144)
SODIUM SERPL-SCNC: 143 MMOL/L (ref 136–144)
SP GR UR STRIP.AUTO: 1.01 (ref 1–1.03)
T AXIS: -53 DEGREES
UROBILINOGEN UR STRIP.AUTO-MCNC: 2 MG/DL
VENTRICULAR RATE: 86 BPM
WBC # BLD AUTO: 7.6 X10(3) UL (ref 4–13)
WBC # BLD AUTO: 8.7 X10(3) UL (ref 4–13)
WBC # BLD AUTO: 8.7 X10(3) UL (ref 4–13)
WBC # BLD AUTO: 9.6 X10(3) UL (ref 4–13)
WBC # BLD AUTO: 9.7 X10(3) UL (ref 4–13)
WBC # BLD AUTO: 9.8 X10(3) UL (ref 4–13)
WBC # BLD AUTO: 9.8 X10(3) UL (ref 4–13)
WBC #/AREA URNS AUTO: >50 /HPF

## 2018-01-01 PROCEDURE — 81003 URINALYSIS AUTO W/O SCOPE: CPT

## 2018-01-01 PROCEDURE — 85025 COMPLETE CBC W/AUTO DIFF WBC: CPT | Performed by: INTERNAL MEDICINE

## 2018-01-01 PROCEDURE — 86900 BLOOD TYPING SEROLOGIC ABO: CPT

## 2018-01-01 PROCEDURE — 71045 X-RAY EXAM CHEST 1 VIEW: CPT | Performed by: EMERGENCY MEDICINE

## 2018-01-01 PROCEDURE — 99309 SBSQ NF CARE MODERATE MDM 30: CPT | Performed by: INTERNAL MEDICINE

## 2018-01-01 PROCEDURE — 99283 EMERGENCY DEPT VISIT LOW MDM: CPT

## 2018-01-01 PROCEDURE — 80053 COMPREHEN METABOLIC PANEL: CPT

## 2018-01-01 PROCEDURE — 97110 THERAPEUTIC EXERCISES: CPT

## 2018-01-01 PROCEDURE — 99204 OFFICE O/P NEW MOD 45 MIN: CPT | Performed by: OTHER

## 2018-01-01 PROCEDURE — 93970 EXTREMITY STUDY: CPT | Performed by: EMERGENCY MEDICINE

## 2018-01-01 PROCEDURE — 85027 COMPLETE CBC AUTOMATED: CPT | Performed by: NURSE PRACTITIONER

## 2018-01-01 PROCEDURE — 97530 THERAPEUTIC ACTIVITIES: CPT

## 2018-01-01 PROCEDURE — 88311 DECALCIFY TISSUE: CPT | Performed by: ORTHOPAEDIC SURGERY

## 2018-01-01 PROCEDURE — 99223 1ST HOSP IP/OBS HIGH 75: CPT | Performed by: HOSPITALIST

## 2018-01-01 PROCEDURE — 99233 SBSQ HOSP IP/OBS HIGH 50: CPT | Performed by: HOSPITALIST

## 2018-01-01 PROCEDURE — 99217 OBSERVATION CARE DISCHARGE: CPT | Performed by: STUDENT IN AN ORGANIZED HEALTH CARE EDUCATION/TRAINING PROGRAM

## 2018-01-01 PROCEDURE — 73030 X-RAY EXAM OF SHOULDER: CPT | Performed by: ORTHOPAEDIC SURGERY

## 2018-01-01 PROCEDURE — 93306 TTE W/DOPPLER COMPLETE: CPT | Performed by: NURSE PRACTITIONER

## 2018-01-01 PROCEDURE — 85025 COMPLETE CBC W/AUTO DIFF WBC: CPT

## 2018-01-01 PROCEDURE — 93970 EXTREMITY STUDY: CPT | Performed by: INTERNAL MEDICINE

## 2018-01-01 PROCEDURE — 87088 URINE BACTERIA CULTURE: CPT

## 2018-01-01 PROCEDURE — 99217 OBSERVATION CARE DISCHARGE: CPT | Performed by: HOSPITALIST

## 2018-01-01 PROCEDURE — 97162 PT EVAL MOD COMPLEX 30 MIN: CPT

## 2018-01-01 PROCEDURE — 70450 CT HEAD/BRAIN W/O DYE: CPT | Performed by: EMERGENCY MEDICINE

## 2018-01-01 PROCEDURE — 36415 COLL VENOUS BLD VENIPUNCTURE: CPT | Performed by: INTERNAL MEDICINE

## 2018-01-01 PROCEDURE — 86901 BLOOD TYPING SEROLOGIC RH(D): CPT

## 2018-01-01 PROCEDURE — 99214 OFFICE O/P EST MOD 30 MIN: CPT | Performed by: INTERNAL MEDICINE

## 2018-01-01 PROCEDURE — 87081 CULTURE SCREEN ONLY: CPT | Performed by: INTERNAL MEDICINE

## 2018-01-01 PROCEDURE — 0RRJ00Z REPLACEMENT OF RIGHT SHOULDER JOINT WITH REVERSE BALL AND SOCKET SYNTHETIC SUBSTITUTE, OPEN APPROACH: ICD-10-PCS | Performed by: ORTHOPAEDIC SURGERY

## 2018-01-01 PROCEDURE — 3E0T3BZ INTRODUCTION OF ANESTHETIC AGENT INTO PERIPHERAL NERVES AND PLEXI, PERCUTANEOUS APPROACH: ICD-10-PCS | Performed by: ANESTHESIOLOGY

## 2018-01-01 PROCEDURE — 97535 SELF CARE MNGMENT TRAINING: CPT

## 2018-01-01 PROCEDURE — 87186 SC STD MICRODIL/AGAR DIL: CPT

## 2018-01-01 PROCEDURE — 87086 URINE CULTURE/COLONY COUNT: CPT

## 2018-01-01 PROCEDURE — 99305 1ST NF CARE MODERATE MDM 35: CPT | Performed by: INTERNAL MEDICINE

## 2018-01-01 PROCEDURE — 76942 ECHO GUIDE FOR BIOPSY: CPT | Performed by: ORTHOPAEDIC SURGERY

## 2018-01-01 PROCEDURE — 70496 CT ANGIOGRAPHY HEAD: CPT | Performed by: EMERGENCY MEDICINE

## 2018-01-01 PROCEDURE — 81001 URINALYSIS AUTO W/SCOPE: CPT

## 2018-01-01 PROCEDURE — 99223 1ST HOSP IP/OBS HIGH 75: CPT | Performed by: INTERNAL MEDICINE

## 2018-01-01 PROCEDURE — 97166 OT EVAL MOD COMPLEX 45 MIN: CPT

## 2018-01-01 PROCEDURE — 85025 COMPLETE CBC W/AUTO DIFF WBC: CPT | Performed by: HOSPITALIST

## 2018-01-01 PROCEDURE — 84132 ASSAY OF SERUM POTASSIUM: CPT | Performed by: INTERNAL MEDICINE

## 2018-01-01 PROCEDURE — 71045 X-RAY EXAM CHEST 1 VIEW: CPT | Performed by: HOSPITALIST

## 2018-01-01 PROCEDURE — 93010 ELECTROCARDIOGRAM REPORT: CPT | Performed by: INTERNAL MEDICINE

## 2018-01-01 PROCEDURE — 99239 HOSP IP/OBS DSCHRG MGMT >30: CPT | Performed by: HOSPITALIST

## 2018-01-01 PROCEDURE — 99220 INITIAL OBSERVATION CARE,LEVL III: CPT | Performed by: HOSPITALIST

## 2018-01-01 PROCEDURE — 73030 X-RAY EXAM OF SHOULDER: CPT | Performed by: EMERGENCY MEDICINE

## 2018-01-01 PROCEDURE — 97116 GAIT TRAINING THERAPY: CPT

## 2018-01-01 PROCEDURE — 86850 RBC ANTIBODY SCREEN: CPT

## 2018-01-01 PROCEDURE — 70498 CT ANGIOGRAPHY NECK: CPT | Performed by: EMERGENCY MEDICINE

## 2018-01-01 PROCEDURE — 88305 TISSUE EXAM BY PATHOLOGIST: CPT | Performed by: ORTHOPAEDIC SURGERY

## 2018-01-01 PROCEDURE — 71046 X-RAY EXAM CHEST 2 VIEWS: CPT | Performed by: INTERNAL MEDICINE

## 2018-01-01 PROCEDURE — 85018 HEMOGLOBIN: CPT | Performed by: HOSPITALIST

## 2018-01-01 PROCEDURE — 99225 SUBSEQUENT OBSERVATION CARE: CPT | Performed by: INTERNAL MEDICINE

## 2018-01-01 PROCEDURE — 71275 CT ANGIOGRAPHY CHEST: CPT | Performed by: EMERGENCY MEDICINE

## 2018-01-01 PROCEDURE — 93005 ELECTROCARDIOGRAM TRACING: CPT

## 2018-01-01 PROCEDURE — 99232 SBSQ HOSP IP/OBS MODERATE 35: CPT | Performed by: INTERNAL MEDICINE

## 2018-01-01 PROCEDURE — 80048 BASIC METABOLIC PNL TOTAL CA: CPT | Performed by: INTERNAL MEDICINE

## 2018-01-01 DEVICE — IMPLANTABLE DEVICE: Type: IMPLANTABLE DEVICE | Site: SHOULDER | Status: FUNCTIONAL

## 2018-01-01 DEVICE — INVERSE/REVERSE SCREW SYSTEM, 4.5-48
Type: IMPLANTABLE DEVICE | Site: SHOULDER | Status: FUNCTIONAL
Brand: INVERSE/REVERSE

## 2018-01-01 DEVICE — TM REV STEM 12MMX130MM: Type: IMPLANTABLE DEVICE | Site: SHOULDER | Status: FUNCTIONAL

## 2018-01-01 DEVICE — TM RVS BASE PLT 15MM POS: Type: IMPLANTABLE DEVICE | Site: SHOULDER | Status: FUNCTIONAL

## 2018-01-01 DEVICE — TM REV 36MM GLENOSPHERE: Type: IMPLANTABLE DEVICE | Site: SHOULDER | Status: FUNCTIONAL

## 2018-01-01 DEVICE — REFOBACIN BC R 1X40 US: Type: IMPLANTABLE DEVICE | Site: SHOULDER | Status: FUNCTIONAL

## 2018-01-01 RX ORDER — CLOPIDOGREL BISULFATE 75 MG/1
75 TABLET ORAL DAILY
Status: DISCONTINUED | OUTPATIENT
Start: 2018-01-01 | End: 2018-01-01

## 2018-01-01 RX ORDER — MIRTAZAPINE 7.5 MG/1
7.5 TABLET, FILM COATED ORAL NIGHTLY
COMMUNITY

## 2018-01-01 RX ORDER — FUROSEMIDE 10 MG/ML
20 INJECTION INTRAMUSCULAR; INTRAVENOUS ONCE
Status: COMPLETED | OUTPATIENT
Start: 2018-01-01 | End: 2018-01-01

## 2018-01-01 RX ORDER — POLYETHYLENE GLYCOL 3350 17 G/17G
17 POWDER, FOR SOLUTION ORAL DAILY
Qty: 14 EACH | Status: SHIPPED | OUTPATIENT
Start: 2018-01-01 | End: 2018-01-01 | Stop reason: CLARIF

## 2018-01-01 RX ORDER — OXYCODONE HYDROCHLORIDE 5 MG/1
2.5 TABLET ORAL EVERY 4 HOURS PRN
Status: DISPENSED | OUTPATIENT
Start: 2018-01-01 | End: 2018-01-01

## 2018-01-01 RX ORDER — SODIUM CHLORIDE, SODIUM LACTATE, POTASSIUM CHLORIDE, CALCIUM CHLORIDE 600; 310; 30; 20 MG/100ML; MG/100ML; MG/100ML; MG/100ML
INJECTION, SOLUTION INTRAVENOUS CONTINUOUS
Status: DISCONTINUED | OUTPATIENT
Start: 2018-01-01 | End: 2018-01-01 | Stop reason: HOSPADM

## 2018-01-01 RX ORDER — ACETAMINOPHEN 325 MG/1
650 TABLET ORAL EVERY 6 HOURS PRN
Status: DISCONTINUED | OUTPATIENT
Start: 2018-01-01 | End: 2018-01-01

## 2018-01-01 RX ORDER — CEFAZOLIN SODIUM/WATER 2 G/20 ML
2 SYRINGE (ML) INTRAVENOUS EVERY 8 HOURS
Status: COMPLETED | OUTPATIENT
Start: 2018-01-01 | End: 2018-01-01

## 2018-01-01 RX ORDER — OXYCODONE HYDROCHLORIDE 5 MG/1
5 TABLET ORAL EVERY 4 HOURS PRN
Status: DISCONTINUED | OUTPATIENT
Start: 2018-01-01 | End: 2018-01-01

## 2018-01-01 RX ORDER — HYDROCODONE BITARTRATE AND ACETAMINOPHEN 5; 325 MG/1; MG/1
1 TABLET ORAL EVERY 4 HOURS PRN
Qty: 30 TABLET | Refills: 0 | Status: ON HOLD | OUTPATIENT
Start: 2018-01-01 | End: 2018-01-01

## 2018-01-01 RX ORDER — CEFAZOLIN SODIUM/WATER 2 G/20 ML
2 SYRINGE (ML) INTRAVENOUS ONCE
Status: COMPLETED | OUTPATIENT
Start: 2018-01-01 | End: 2018-01-01

## 2018-01-01 RX ORDER — ENOXAPARIN SODIUM 100 MG/ML
1 INJECTION SUBCUTANEOUS EVERY 12 HOURS SCHEDULED
Status: DISCONTINUED | OUTPATIENT
Start: 2018-01-01 | End: 2018-01-01

## 2018-01-01 RX ORDER — MORPHINE SULFATE 4 MG/ML
2 INJECTION, SOLUTION INTRAMUSCULAR; INTRAVENOUS EVERY 5 MIN PRN
Status: DISCONTINUED | OUTPATIENT
Start: 2018-01-01 | End: 2018-01-01 | Stop reason: HOSPADM

## 2018-01-01 RX ORDER — TORSEMIDE 20 MG/1
20 TABLET ORAL DAILY
Status: DISCONTINUED | OUTPATIENT
Start: 2018-01-01 | End: 2018-01-01

## 2018-01-01 RX ORDER — CARVEDILOL 6.25 MG/1
6.25 TABLET ORAL 2 TIMES DAILY WITH MEALS
Status: DISCONTINUED | OUTPATIENT
Start: 2018-01-01 | End: 2018-01-01

## 2018-01-01 RX ORDER — PANTOPRAZOLE SODIUM 40 MG/1
40 TABLET, DELAYED RELEASE ORAL
Status: DISCONTINUED | OUTPATIENT
Start: 2018-01-01 | End: 2018-01-01

## 2018-01-01 RX ORDER — FUROSEMIDE 10 MG/ML
40 INJECTION INTRAMUSCULAR; INTRAVENOUS ONCE
Status: COMPLETED | OUTPATIENT
Start: 2018-01-01 | End: 2018-01-01

## 2018-01-01 RX ORDER — DOCUSATE SODIUM 100 MG/1
100 CAPSULE, LIQUID FILLED ORAL 2 TIMES DAILY
COMMUNITY

## 2018-01-01 RX ORDER — TIZANIDINE 2 MG/1
2 TABLET ORAL 3 TIMES DAILY PRN
Status: DISCONTINUED | OUTPATIENT
Start: 2018-01-01 | End: 2018-01-01

## 2018-01-01 RX ORDER — ONDANSETRON 2 MG/ML
4 INJECTION INTRAMUSCULAR; INTRAVENOUS AS NEEDED
Status: DISCONTINUED | OUTPATIENT
Start: 2018-01-01 | End: 2018-01-01 | Stop reason: HOSPADM

## 2018-01-01 RX ORDER — ONDANSETRON 2 MG/ML
4 INJECTION INTRAMUSCULAR; INTRAVENOUS EVERY 6 HOURS PRN
Status: DISCONTINUED | OUTPATIENT
Start: 2018-01-01 | End: 2018-01-01

## 2018-01-01 RX ORDER — ASPIRIN 81 MG/1
81 TABLET, CHEWABLE ORAL DAILY
Status: DISCONTINUED | OUTPATIENT
Start: 2018-01-01 | End: 2018-01-01

## 2018-01-01 RX ORDER — TRAMADOL HYDROCHLORIDE 50 MG/1
50 TABLET ORAL EVERY 12 HOURS PRN
Qty: 60 TABLET | Refills: 0 | Status: ON HOLD | OUTPATIENT
Start: 2018-01-01 | End: 2018-01-01

## 2018-01-01 RX ORDER — FAMOTIDINE 20 MG/1
20 TABLET ORAL DAILY
Status: DISCONTINUED | OUTPATIENT
Start: 2018-01-01 | End: 2018-01-01

## 2018-01-01 RX ORDER — SODIUM CHLORIDE, SODIUM LACTATE, POTASSIUM CHLORIDE, CALCIUM CHLORIDE 600; 310; 30; 20 MG/100ML; MG/100ML; MG/100ML; MG/100ML
INJECTION, SOLUTION INTRAVENOUS CONTINUOUS
Status: DISCONTINUED | OUTPATIENT
Start: 2018-01-01 | End: 2018-01-01

## 2018-01-01 RX ORDER — MORPHINE SULFATE 4 MG/ML
2 INJECTION, SOLUTION INTRAMUSCULAR; INTRAVENOUS EVERY 2 HOUR PRN
Status: DISCONTINUED | OUTPATIENT
Start: 2018-01-01 | End: 2018-01-01

## 2018-01-01 RX ORDER — ASPIRIN 81 MG
TABLET, DELAYED RELEASE (ENTERIC COATED) ORAL
Qty: 30 TABLET | Status: SHIPPED | OUTPATIENT
Start: 2018-01-01 | End: 2018-01-01 | Stop reason: CLARIF

## 2018-01-01 RX ORDER — TRAZODONE HYDROCHLORIDE 50 MG/1
50 TABLET ORAL NIGHTLY PRN
Status: DISCONTINUED | OUTPATIENT
Start: 2018-01-01 | End: 2018-01-01

## 2018-01-01 RX ORDER — OXYCODONE HYDROCHLORIDE 10 MG/1
10 TABLET ORAL EVERY 4 HOURS PRN
Status: DISCONTINUED | OUTPATIENT
Start: 2018-01-01 | End: 2018-01-01

## 2018-01-01 RX ORDER — ATORVASTATIN CALCIUM 40 MG/1
40 TABLET, FILM COATED ORAL NIGHTLY
Status: DISCONTINUED | OUTPATIENT
Start: 2018-01-01 | End: 2018-01-01

## 2018-01-01 RX ORDER — CEPHALEXIN 500 MG/1
500 CAPSULE ORAL 4 TIMES DAILY
Qty: 28 CAPSULE | Refills: 0 | Status: SHIPPED | OUTPATIENT
Start: 2018-01-01 | End: 2018-01-01

## 2018-01-01 RX ORDER — ENOXAPARIN SODIUM 100 MG/ML
1 INJECTION SUBCUTANEOUS EVERY 24 HOURS
Status: DISCONTINUED | OUTPATIENT
Start: 2018-01-01 | End: 2018-01-01

## 2018-01-01 RX ORDER — TRAMADOL HYDROCHLORIDE 50 MG/1
50 TABLET ORAL EVERY 12 HOURS PRN
Status: DISCONTINUED | OUTPATIENT
Start: 2018-01-01 | End: 2018-01-01

## 2018-01-01 RX ORDER — MORPHINE SULFATE 4 MG/ML
1 INJECTION, SOLUTION INTRAMUSCULAR; INTRAVENOUS EVERY 2 HOUR PRN
Status: DISCONTINUED | OUTPATIENT
Start: 2018-01-01 | End: 2018-01-01

## 2018-01-01 RX ORDER — TRAMADOL HYDROCHLORIDE 50 MG/1
50 TABLET ORAL EVERY 12 HOURS PRN
Qty: 20 TABLET | Refills: 0 | Status: SHIPPED | OUTPATIENT
Start: 2018-01-01

## 2018-01-01 RX ORDER — ACETAMINOPHEN 325 MG/1
325 TABLET ORAL EVERY 6 HOURS PRN
Status: ON HOLD | COMMUNITY
End: 2018-01-01

## 2018-01-01 RX ORDER — LABETALOL HYDROCHLORIDE 5 MG/ML
10 INJECTION, SOLUTION INTRAVENOUS EVERY 10 MIN PRN
Status: DISCONTINUED | OUTPATIENT
Start: 2018-01-01 | End: 2018-01-01

## 2018-01-01 RX ORDER — POTASSIUM CHLORIDE 20 MEQ/1
40 TABLET, EXTENDED RELEASE ORAL EVERY 4 HOURS
Status: COMPLETED | OUTPATIENT
Start: 2018-01-01 | End: 2018-01-01

## 2018-01-01 RX ORDER — POLYETHYLENE GLYCOL 3350 17 G/17G
17 POWDER, FOR SOLUTION ORAL DAILY PRN
Qty: 12 EACH | Status: SHIPPED | OUTPATIENT
Start: 2018-01-01 | End: 2018-01-01

## 2018-01-01 RX ORDER — SODIUM CHLORIDE 9 MG/ML
INJECTION, SOLUTION INTRAVENOUS CONTINUOUS
Status: DISCONTINUED | OUTPATIENT
Start: 2018-01-01 | End: 2018-01-01

## 2018-01-01 RX ORDER — MIRTAZAPINE 15 MG/1
7.5 TABLET, FILM COATED ORAL NIGHTLY
Status: DISCONTINUED | OUTPATIENT
Start: 2018-01-01 | End: 2018-01-01

## 2018-01-01 RX ORDER — ACETAMINOPHEN 325 MG/1
650 TABLET ORAL ONCE
Status: DISCONTINUED | OUTPATIENT
Start: 2018-01-01 | End: 2018-01-01 | Stop reason: HOSPADM

## 2018-01-01 RX ORDER — CEFAZOLIN SODIUM/WATER 2 G/20 ML
2 SYRINGE (ML) INTRAVENOUS EVERY 8 HOURS
Status: DISCONTINUED | OUTPATIENT
Start: 2018-01-01 | End: 2018-01-01

## 2018-01-01 RX ORDER — NAPROXEN 250 MG/1
250 TABLET ORAL 2 TIMES DAILY WITH MEALS
Qty: 60 TABLET | Refills: 1 | Status: SHIPPED | OUTPATIENT
Start: 2018-01-01 | End: 2018-01-01 | Stop reason: ALTCHOICE

## 2018-01-01 RX ORDER — POTASSIUM CHLORIDE 750 MG/1
TABLET, FILM COATED, EXTENDED RELEASE ORAL
Qty: 120 TABLET | Refills: 11 | Status: SHIPPED | OUTPATIENT
Start: 2018-01-01 | End: 2018-01-01

## 2018-01-01 RX ORDER — DIPHENHYDRAMINE HYDROCHLORIDE 50 MG/ML
25 INJECTION INTRAMUSCULAR; INTRAVENOUS ONCE AS NEEDED
Status: ACTIVE | OUTPATIENT
Start: 2018-01-01 | End: 2018-01-01

## 2018-01-01 RX ORDER — FAMOTIDINE 10 MG/ML
20 INJECTION, SOLUTION INTRAVENOUS DAILY
Status: DISCONTINUED | OUTPATIENT
Start: 2018-01-01 | End: 2018-01-01

## 2018-01-01 RX ORDER — CLOPIDOGREL BISULFATE 75 MG/1
75 TABLET ORAL DAILY
Qty: 30 TABLET | Refills: 1 | Status: SHIPPED | OUTPATIENT
Start: 2018-01-01

## 2018-01-01 RX ORDER — IPRATROPIUM BROMIDE AND ALBUTEROL SULFATE 2.5; .5 MG/3ML; MG/3ML
SOLUTION RESPIRATORY (INHALATION)
Status: COMPLETED
Start: 2018-01-01 | End: 2018-01-01

## 2018-01-01 RX ORDER — PHENYLEPHRINE HCL IN 0.9% NACL 50MG/250ML
PLASTIC BAG, INJECTION (ML) INTRAVENOUS CONTINUOUS PRN
Status: DISCONTINUED | OUTPATIENT
Start: 2018-01-01 | End: 2018-01-01

## 2018-01-01 RX ORDER — NAPROXEN 250 MG/1
250 TABLET ORAL 2 TIMES DAILY WITH MEALS
Status: ON HOLD | COMMUNITY
End: 2018-01-01

## 2018-01-01 RX ORDER — HYDROCODONE BITARTRATE AND ACETAMINOPHEN 5; 325 MG/1; MG/1
2 TABLET ORAL EVERY 4 HOURS PRN
Status: DISCONTINUED | OUTPATIENT
Start: 2018-01-01 | End: 2018-01-01

## 2018-01-01 RX ORDER — CIPROFLOXACIN 250 MG/1
250 TABLET, FILM COATED ORAL 2 TIMES DAILY
Qty: 14 TABLET | Refills: 0 | Status: SHIPPED | OUTPATIENT
Start: 2018-01-01 | End: 2018-01-01 | Stop reason: ALTCHOICE

## 2018-01-01 RX ORDER — DOCUSATE SODIUM 100 MG/1
100 CAPSULE, LIQUID FILLED ORAL 2 TIMES DAILY
Status: DISCONTINUED | OUTPATIENT
Start: 2018-01-01 | End: 2018-01-01

## 2018-01-01 RX ORDER — METOCLOPRAMIDE HYDROCHLORIDE 5 MG/ML
5 INJECTION INTRAMUSCULAR; INTRAVENOUS EVERY 8 HOURS PRN
Status: DISCONTINUED | OUTPATIENT
Start: 2018-01-01 | End: 2018-01-01

## 2018-01-01 RX ORDER — MORPHINE SULFATE 4 MG/ML
4 INJECTION, SOLUTION INTRAMUSCULAR; INTRAVENOUS EVERY 2 HOUR PRN
Status: DISCONTINUED | OUTPATIENT
Start: 2018-01-01 | End: 2018-01-01

## 2018-01-01 RX ORDER — FUROSEMIDE 10 MG/ML
40 INJECTION INTRAMUSCULAR; INTRAVENOUS
Status: DISCONTINUED | OUTPATIENT
Start: 2018-01-01 | End: 2018-01-01

## 2018-01-01 RX ORDER — METOCLOPRAMIDE HYDROCHLORIDE 5 MG/ML
10 INJECTION INTRAMUSCULAR; INTRAVENOUS EVERY 6 HOURS PRN
Status: DISCONTINUED | OUTPATIENT
Start: 2018-01-01 | End: 2018-01-01

## 2018-01-01 RX ORDER — HYDROCODONE BITARTRATE AND ACETAMINOPHEN 5; 325 MG/1; MG/1
1 TABLET ORAL EVERY 4 HOURS PRN
Status: DISCONTINUED | OUTPATIENT
Start: 2018-01-01 | End: 2018-01-01

## 2018-01-01 RX ORDER — FUROSEMIDE 10 MG/ML
20 INJECTION INTRAMUSCULAR; INTRAVENOUS
Status: DISCONTINUED | OUTPATIENT
Start: 2018-01-01 | End: 2018-01-01

## 2018-01-01 RX ORDER — POLYETHYLENE GLYCOL 3350 17 G/17G
17 POWDER, FOR SOLUTION ORAL DAILY PRN
COMMUNITY

## 2018-01-01 RX ORDER — ZOLPIDEM TARTRATE 5 MG/1
5 TABLET ORAL NIGHTLY PRN
Status: DISCONTINUED | OUTPATIENT
Start: 2018-01-01 | End: 2018-01-01

## 2018-01-01 RX ORDER — ONDANSETRON 2 MG/ML
INJECTION INTRAMUSCULAR; INTRAVENOUS
Status: DISCONTINUED | OUTPATIENT
Start: 2018-01-01 | End: 2018-01-01 | Stop reason: HOSPADM

## 2018-01-01 RX ORDER — NALOXONE HYDROCHLORIDE 0.4 MG/ML
80 INJECTION, SOLUTION INTRAMUSCULAR; INTRAVENOUS; SUBCUTANEOUS AS NEEDED
Status: DISCONTINUED | OUTPATIENT
Start: 2018-01-01 | End: 2018-01-01 | Stop reason: HOSPADM

## 2018-01-01 RX ORDER — POTASSIUM CHLORIDE 750 MG/1
20 TABLET, FILM COATED, EXTENDED RELEASE ORAL 2 TIMES DAILY
Qty: 130 TABLET | Refills: 11 | Status: SHIPPED | OUTPATIENT
Start: 2018-01-01

## 2018-01-01 RX ORDER — TORSEMIDE 20 MG/1
20 TABLET ORAL DAILY
Qty: 3 TABLET | Refills: 0 | Status: SHIPPED | OUTPATIENT
Start: 2018-01-01 | End: 2018-01-01 | Stop reason: CLARIF

## 2018-01-01 RX ORDER — ONDANSETRON 2 MG/ML
4 INJECTION INTRAMUSCULAR; INTRAVENOUS EVERY 4 HOURS PRN
Status: ACTIVE | OUTPATIENT
Start: 2018-01-01 | End: 2018-01-01

## 2018-01-01 RX ORDER — ACETAMINOPHEN 650 MG/1
650 SUPPOSITORY RECTAL EVERY 4 HOURS PRN
Status: DISCONTINUED | OUTPATIENT
Start: 2018-01-01 | End: 2018-01-01

## 2018-01-01 RX ORDER — ASPIRIN 325 MG
325 TABLET, DELAYED RELEASE (ENTERIC COATED) ORAL ONCE
Status: COMPLETED | OUTPATIENT
Start: 2018-01-01 | End: 2018-01-01

## 2018-01-01 RX ORDER — ASPIRIN 81 MG/1
81 TABLET ORAL DAILY
Status: ON HOLD | COMMUNITY
End: 2018-01-01

## 2018-01-01 RX ORDER — HEPARIN SODIUM 5000 [USP'U]/ML
5000 INJECTION, SOLUTION INTRAVENOUS; SUBCUTANEOUS EVERY 12 HOURS SCHEDULED
Status: DISCONTINUED | OUTPATIENT
Start: 2018-01-01 | End: 2018-01-01

## 2018-01-01 RX ORDER — TORSEMIDE 20 MG/1
40 TABLET ORAL DAILY
Qty: 30 TABLET | Refills: 11 | Status: SHIPPED | OUTPATIENT
Start: 2018-01-01

## 2018-01-01 RX ORDER — ASPIRIN 325 MG
325 TABLET, DELAYED RELEASE (ENTERIC COATED) ORAL DAILY
Status: DISCONTINUED | OUTPATIENT
Start: 2018-01-01 | End: 2018-01-01

## 2018-01-01 RX ORDER — ACETAMINOPHEN 500 MG
1000 TABLET ORAL ONCE
Status: ON HOLD | COMMUNITY
End: 2018-01-01

## 2018-01-01 RX ORDER — CEFAZOLIN SODIUM/WATER 2 G/20 ML
SYRINGE (ML) INTRAVENOUS
Status: DISPENSED
Start: 2018-01-01 | End: 2018-01-01

## 2018-01-01 RX ORDER — ACETAMINOPHEN 500 MG
1000 TABLET ORAL ONCE
Status: DISCONTINUED | OUTPATIENT
Start: 2018-01-01 | End: 2018-01-01 | Stop reason: HOSPADM

## 2018-01-01 RX ORDER — TORSEMIDE 20 MG/1
40 TABLET ORAL DAILY
Status: DISCONTINUED | OUTPATIENT
Start: 2018-01-01 | End: 2018-01-01

## 2018-01-01 RX ORDER — ACETAMINOPHEN 325 MG/1
650 TABLET ORAL EVERY 4 HOURS PRN
Status: DISCONTINUED | OUTPATIENT
Start: 2018-01-01 | End: 2018-01-01

## 2018-01-01 RX ORDER — ACETAMINOPHEN 325 MG/1
650 TABLET ORAL 4 TIMES DAILY
Status: COMPLETED | OUTPATIENT
Start: 2018-01-01 | End: 2018-01-01

## 2018-01-01 RX ORDER — POTASSIUM CHLORIDE 20 MEQ/1
20 TABLET, EXTENDED RELEASE ORAL 2 TIMES DAILY
Status: DISCONTINUED | OUTPATIENT
Start: 2018-01-01 | End: 2018-01-01

## 2018-01-01 RX ORDER — METOCLOPRAMIDE HYDROCHLORIDE 5 MG/ML
5 INJECTION INTRAMUSCULAR; INTRAVENOUS EVERY 6 HOURS PRN
Status: ACTIVE | OUTPATIENT
Start: 2018-01-01 | End: 2018-01-01

## 2018-01-01 RX ORDER — ACETAMINOPHEN 325 MG/1
325 TABLET ORAL EVERY 6 HOURS PRN
Qty: 120 TABLET | Refills: 1 | Status: SHIPPED | OUTPATIENT
Start: 2018-01-01 | End: 2018-01-01 | Stop reason: ALTCHOICE

## 2018-01-01 RX ORDER — MIDAZOLAM HYDROCHLORIDE 1 MG/ML
1 INJECTION INTRAMUSCULAR; INTRAVENOUS EVERY 5 MIN PRN
Status: DISCONTINUED | OUTPATIENT
Start: 2018-01-01 | End: 2018-01-01 | Stop reason: HOSPADM

## 2018-01-01 RX ORDER — ENOXAPARIN SODIUM 100 MG/ML
1 INJECTION SUBCUTANEOUS ONCE
Status: COMPLETED | OUTPATIENT
Start: 2018-01-01 | End: 2018-01-01

## 2018-01-01 RX ORDER — FUROSEMIDE 10 MG/ML
20 INJECTION INTRAMUSCULAR; INTRAVENOUS
Status: COMPLETED | OUTPATIENT
Start: 2018-01-01 | End: 2018-01-01

## 2018-01-12 ENCOUNTER — OFFICE VISIT (OUTPATIENT)
Dept: INTERNAL MEDICINE CLINIC | Facility: CLINIC | Age: 83
End: 2018-01-12

## 2018-01-12 VITALS
RESPIRATION RATE: 18 BRPM | WEIGHT: 110.38 LBS | OXYGEN SATURATION: 93 % | HEIGHT: 64 IN | DIASTOLIC BLOOD PRESSURE: 82 MMHG | SYSTOLIC BLOOD PRESSURE: 138 MMHG | BODY MASS INDEX: 18.85 KG/M2 | HEART RATE: 68 BPM | TEMPERATURE: 97 F

## 2018-01-12 DIAGNOSIS — I48.20 CHRONIC ATRIAL FIBRILLATION (HCC): Primary | ICD-10-CM

## 2018-01-12 DIAGNOSIS — I10 ESSENTIAL HYPERTENSION, BENIGN: ICD-10-CM

## 2018-01-12 DIAGNOSIS — I27.20 PULMONARY HTN (HCC): ICD-10-CM

## 2018-01-12 DIAGNOSIS — N17.9 AKI (ACUTE KIDNEY INJURY) (HCC): ICD-10-CM

## 2018-01-12 LAB — INR: 2.5 (ref 0.8–1.2)

## 2018-01-12 PROCEDURE — 85610 PROTHROMBIN TIME: CPT | Performed by: INTERNAL MEDICINE

## 2018-01-12 PROCEDURE — 99214 OFFICE O/P EST MOD 30 MIN: CPT | Performed by: INTERNAL MEDICINE

## 2018-01-12 NOTE — PROGRESS NOTES
Geronimo Hernandez is new to our practice. She is here with her health at home nurse. Patient is alert denies any headaches dizziness chest pain shortness of breath. Appetite is good no problems with bowels.   Has a history of chronic A. fib, congestive heart failure

## 2018-02-01 ENCOUNTER — NURSE ONLY (OUTPATIENT)
Dept: INTERNAL MEDICINE CLINIC | Facility: CLINIC | Age: 83
End: 2018-02-01

## 2018-02-01 DIAGNOSIS — I48.20 CHRONIC ATRIAL FIBRILLATION (HCC): Primary | ICD-10-CM

## 2018-02-01 LAB — INR: 1.5 (ref 0.8–1.2)

## 2018-02-01 PROCEDURE — 85610 PROTHROMBIN TIME: CPT | Performed by: INTERNAL MEDICINE

## 2018-02-08 ENCOUNTER — NURSE ONLY (OUTPATIENT)
Dept: INTERNAL MEDICINE CLINIC | Facility: CLINIC | Age: 83
End: 2018-02-08

## 2018-02-08 ENCOUNTER — TELEPHONE (OUTPATIENT)
Dept: INTERNAL MEDICINE CLINIC | Facility: CLINIC | Age: 83
End: 2018-02-08

## 2018-02-08 DIAGNOSIS — I48.20 CHRONIC ATRIAL FIBRILLATION (HCC): Primary | ICD-10-CM

## 2018-02-08 LAB — INR: 1.5 (ref 0.8–1.2)

## 2018-02-08 PROCEDURE — 85610 PROTHROMBIN TIME: CPT | Performed by: INTERNAL MEDICINE

## 2018-02-08 PROCEDURE — 93793 ANTICOAG MGMT PT WARFARIN: CPT | Performed by: INTERNAL MEDICINE

## 2018-02-08 NOTE — TELEPHONE ENCOUNTER
Serjio called to say she was in the apartment with the patient and the patient fell while she was there. Patient was complaining of right hand and finger pain. Serjio said patient refused to call our office or see our physician.  Told Kathy to call EMTs and s

## 2018-02-13 ENCOUNTER — OFFICE VISIT (OUTPATIENT)
Dept: INTERNAL MEDICINE CLINIC | Facility: CLINIC | Age: 83
End: 2018-02-13

## 2018-02-13 VITALS
SYSTOLIC BLOOD PRESSURE: 120 MMHG | HEART RATE: 64 BPM | OXYGEN SATURATION: 95 % | RESPIRATION RATE: 16 BRPM | WEIGHT: 108.19 LBS | DIASTOLIC BLOOD PRESSURE: 52 MMHG | BODY MASS INDEX: 19 KG/M2

## 2018-02-13 DIAGNOSIS — I48.20 CHRONIC ATRIAL FIBRILLATION (HCC): ICD-10-CM
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
## 2018-02-13 DIAGNOSIS — S40.021A CONTUSION OF RIGHT UPPER ARM, INITIAL ENCOUNTER: ICD-10-CM

## 2018-02-13 LAB — INR: 1.5 (ref 0.8–1.2)

## 2018-02-13 PROCEDURE — 99213 OFFICE O/P EST LOW 20 MIN: CPT | Performed by: INTERNAL MEDICINE

## 2018-02-13 PROCEDURE — 85610 PROTHROMBIN TIME: CPT | Performed by: INTERNAL MEDICINE

## 2018-02-13 NOTE — PATIENT INSTRUCTIONS
TOP FALL PREVENTION TIPS    INSIDE YOUR HOME   KITCHEN:  · Use non skid mats only. · Clean up spills as soon as they happen. · Keep objects that you use often within easy reach.   BATHROOM:  · Install grab bars on the bathroom walls beside the tub, show

## 2018-02-13 NOTE — PROGRESS NOTES
Estrellita fell a couple days ago. Did not lose consciousness did not strike her head. Did hit her right forearm. Physical examination pleasant individual in no acute distress. Normocephalic nontraumatic. Chest wall nontender.   Range of motion shoulders

## 2018-02-14 NOTE — PROGRESS NOTES
Received a faxed note from [address] RN who contacted the patient and home health nurse. New dosin.5mg on , 5mg on  and 2/15 and recheck INR on .     PATIENT AND HOME HEALTH NURSE WAS INSTRUCTED FOR MARIO

## 2018-02-16 ENCOUNTER — NURSE ONLY (OUTPATIENT)
Dept: INTERNAL MEDICINE CLINIC | Facility: CLINIC | Age: 83
End: 2018-02-16

## 2018-02-16 ENCOUNTER — TELEPHONE (OUTPATIENT)
Dept: INTERNAL MEDICINE CLINIC | Facility: CLINIC | Age: 83
End: 2018-02-16

## 2018-02-16 DIAGNOSIS — I48.20 CHRONIC ATRIAL FIBRILLATION (HCC): Primary | ICD-10-CM

## 2018-02-16 LAB — INR: 1.6 (ref 0.8–1.2)

## 2018-02-16 PROCEDURE — 93793 ANTICOAG MGMT PT WARFARIN: CPT | Performed by: INTERNAL MEDICINE

## 2018-02-16 PROCEDURE — 85610 PROTHROMBIN TIME: CPT | Performed by: INTERNAL MEDICINE

## 2018-02-19 ENCOUNTER — TELEPHONE (OUTPATIENT)
Dept: INTERNAL MEDICINE CLINIC | Facility: CLINIC | Age: 83
End: 2018-02-19

## 2018-02-19 NOTE — TELEPHONE ENCOUNTER
I spoke with patient states she does not remember falling and denies any pain.   Encouraged patient to come in for an appointment but she adamantly refuses

## 2018-02-20 ENCOUNTER — NURSE ONLY (OUTPATIENT)
Dept: INTERNAL MEDICINE CLINIC | Facility: CLINIC | Age: 83
End: 2018-02-20

## 2018-02-20 DIAGNOSIS — I48.20 CHRONIC ATRIAL FIBRILLATION (HCC): Primary | ICD-10-CM

## 2018-02-20 LAB — INR: 1.5 (ref 0.8–1.2)

## 2018-02-20 PROCEDURE — 85610 PROTHROMBIN TIME: CPT | Performed by: INTERNAL MEDICINE

## 2018-02-20 PROCEDURE — 93793 ANTICOAG MGMT PT WARFARIN: CPT | Performed by: INTERNAL MEDICINE

## 2018-02-20 NOTE — PROGRESS NOTES
Received fax from [facility] Coumadin [facility]. They have contacted Kayla Phan LPN [facility] at Home with instructions.   Patient has been scheduled here for INR recheck on 2/23

## 2018-02-23 ENCOUNTER — NURSE ONLY (OUTPATIENT)
Dept: INTERNAL MEDICINE CLINIC | Facility: CLINIC | Age: 83
End: 2018-02-23

## 2018-02-23 DIAGNOSIS — I48.91 ATRIAL FIBRILLATION, UNSPECIFIED TYPE (HCC): Primary | ICD-10-CM

## 2018-02-23 LAB — INR: 2.7 (ref 0.8–1.2)

## 2018-02-23 PROCEDURE — 93793 ANTICOAG MGMT PT WARFARIN: CPT | Performed by: INTERNAL MEDICINE

## 2018-02-23 PROCEDURE — 85610 PROTHROMBIN TIME: CPT | Performed by: INTERNAL MEDICINE

## 2018-02-27 ENCOUNTER — NURSE ONLY (OUTPATIENT)
Dept: INTERNAL MEDICINE CLINIC | Facility: CLINIC | Age: 83
End: 2018-02-27

## 2018-02-27 DIAGNOSIS — I48.20 CHRONIC ATRIAL FIBRILLATION (HCC): Primary | ICD-10-CM

## 2018-02-27 LAB — INR: 1.4 (ref 0.8–1.2)

## 2018-02-27 PROCEDURE — 93793 ANTICOAG MGMT PT WARFARIN: CPT | Performed by: INTERNAL MEDICINE

## 2018-02-27 PROCEDURE — 85610 PROTHROMBIN TIME: CPT | Performed by: INTERNAL MEDICINE

## 2018-02-28 ENCOUNTER — TELEPHONE (OUTPATIENT)
Dept: INTERNAL MEDICINE CLINIC | Facility: CLINIC | Age: 83
End: 2018-02-28

## 2018-02-28 NOTE — TELEPHONE ENCOUNTER
INR is managed by [address] [facility] along with [facility]. Patient is to be taking coumadin 5mg daily but she has missed several doses again because she forgets them in the evening.     Nurse has again set them up for her to take in AM whe

## 2018-03-02 ENCOUNTER — NURSE ONLY (OUTPATIENT)
Dept: INTERNAL MEDICINE CLINIC | Facility: CLINIC | Age: 83
End: 2018-03-02

## 2018-03-02 DIAGNOSIS — I48.20 CHRONIC ATRIAL FIBRILLATION (HCC): Primary | ICD-10-CM

## 2018-03-02 LAB — INR: 1.9 (ref 0.8–1.2)

## 2018-03-02 PROCEDURE — 85610 PROTHROMBIN TIME: CPT | Performed by: INTERNAL MEDICINE

## 2018-03-02 PROCEDURE — 93793 ANTICOAG MGMT PT WARFARIN: CPT | Performed by: INTERNAL MEDICINE

## 2018-03-07 ENCOUNTER — NURSE ONLY (OUTPATIENT)
Dept: INTERNAL MEDICINE CLINIC | Facility: CLINIC | Age: 83
End: 2018-03-07

## 2018-03-07 DIAGNOSIS — I48.20 CHRONIC ATRIAL FIBRILLATION (HCC): Primary | ICD-10-CM

## 2018-03-07 LAB — INR: 2.5 (ref 0.8–1.2)

## 2018-03-07 PROCEDURE — 85610 PROTHROMBIN TIME: CPT | Performed by: INTERNAL MEDICINE

## 2018-03-14 ENCOUNTER — MED REC SCAN ONLY (OUTPATIENT)
Dept: INTERNAL MEDICINE CLINIC | Facility: CLINIC | Age: 83
End: 2018-03-14

## 2018-03-15 ENCOUNTER — NURSE ONLY (OUTPATIENT)
Dept: INTERNAL MEDICINE CLINIC | Facility: CLINIC | Age: 83
End: 2018-03-15

## 2018-03-15 DIAGNOSIS — I48.20 CHRONIC ATRIAL FIBRILLATION (HCC): Primary | ICD-10-CM

## 2018-03-15 LAB — INR: 4.5 (ref 0.8–1.2)

## 2018-03-15 PROCEDURE — 85610 PROTHROMBIN TIME: CPT | Performed by: INTERNAL MEDICINE

## 2018-03-19 ENCOUNTER — NURSE ONLY (OUTPATIENT)
Dept: INTERNAL MEDICINE CLINIC | Facility: CLINIC | Age: 83
End: 2018-03-19

## 2018-03-19 DIAGNOSIS — I48.20 CHRONIC ATRIAL FIBRILLATION (HCC): Primary | ICD-10-CM

## 2018-03-19 LAB — INR: 3.7 (ref 0.8–1.2)

## 2018-03-19 PROCEDURE — 85610 PROTHROMBIN TIME: CPT | Performed by: INTERNAL MEDICINE

## 2018-03-22 ENCOUNTER — NURSE ONLY (OUTPATIENT)
Dept: INTERNAL MEDICINE CLINIC | Facility: CLINIC | Age: 83
End: 2018-03-22

## 2018-03-22 DIAGNOSIS — I48.20 CHRONIC ATRIAL FIBRILLATION (HCC): Primary | ICD-10-CM

## 2018-03-22 LAB — INR: 3.5 (ref 0.8–1.2)

## 2018-03-22 PROCEDURE — 85610 PROTHROMBIN TIME: CPT | Performed by: INTERNAL MEDICINE

## 2018-03-29 ENCOUNTER — TELEPHONE (OUTPATIENT)
Dept: INTERNAL MEDICINE CLINIC | Facility: CLINIC | Age: 83
End: 2018-03-29

## 2018-03-29 ENCOUNTER — NURSE ONLY (OUTPATIENT)
Dept: INTERNAL MEDICINE CLINIC | Facility: CLINIC | Age: 83
End: 2018-03-29

## 2018-03-29 DIAGNOSIS — I48.20 CHRONIC ATRIAL FIBRILLATION (HCC): Primary | ICD-10-CM

## 2018-03-29 PROCEDURE — 85610 PROTHROMBIN TIME: CPT | Performed by: INTERNAL MEDICINE

## 2018-03-29 NOTE — TELEPHONE ENCOUNTER
Nurse states that patient is due for a cardiologist visit, she notified the patient and she will also notify son and [facility] nurse. Nurse is aware of her memory issues.     Nurse will contact [facility] to discuss what coumadin regimen patie

## 2018-04-03 ENCOUNTER — TELEPHONE (OUTPATIENT)
Dept: INTERNAL MEDICINE CLINIC | Facility: CLINIC | Age: 83
End: 2018-04-03

## 2018-04-03 ENCOUNTER — NURSE ONLY (OUTPATIENT)
Dept: INTERNAL MEDICINE CLINIC | Facility: CLINIC | Age: 83
End: 2018-04-03

## 2018-04-03 DIAGNOSIS — I48.20 CHRONIC ATRIAL FIBRILLATION (HCC): Primary | ICD-10-CM

## 2018-04-03 PROCEDURE — 85610 PROTHROMBIN TIME: CPT | Performed by: INTERNAL MEDICINE

## 2018-04-03 NOTE — TELEPHONE ENCOUNTER
Geronimo Minor states they received orders from [address] to increase coumadin to 5mg daily until today and then have INR checked.   She was not aware that [facility] were responsible for scheduling the INR's - she thought Goran Dia

## 2018-04-05 ENCOUNTER — TELEPHONE (OUTPATIENT)
Dept: INTERNAL MEDICINE CLINIC | Facility: CLINIC | Age: 83
End: 2018-04-05

## 2018-04-05 NOTE — TELEPHONE ENCOUNTER
After consulting with Dr Yvette Ramirez - I left a message for son to return our call to schedule an appointment that He can bring his mother in to see Dr Yvette Ramirez and be present during the appointment.     It is concerning that patient is not compliant with coumadin

## 2018-04-09 ENCOUNTER — OFFICE VISIT (OUTPATIENT)
Dept: INTERNAL MEDICINE CLINIC | Facility: CLINIC | Age: 83
End: 2018-04-09

## 2018-04-09 VITALS
RESPIRATION RATE: 16 BRPM | WEIGHT: 109.31 LBS | DIASTOLIC BLOOD PRESSURE: 60 MMHG | SYSTOLIC BLOOD PRESSURE: 122 MMHG | HEART RATE: 67 BPM | BODY MASS INDEX: 19 KG/M2 | OXYGEN SATURATION: 91 %

## 2018-04-09 DIAGNOSIS — I48.20 CHRONIC ATRIAL FIBRILLATION (HCC): ICD-10-CM

## 2018-04-09 DIAGNOSIS — R41.3 MEMORY DEFICIT: Primary | ICD-10-CM

## 2018-04-09 PROCEDURE — 99213 OFFICE O/P EST LOW 20 MIN: CPT | Performed by: INTERNAL MEDICINE

## 2018-04-09 RX ORDER — ASPIRIN 325 MG
325 TABLET, DELAYED RELEASE (ENTERIC COATED) ORAL ONCE
Status: DISCONTINUED | OUTPATIENT
Start: 2018-04-09 | End: 2018-01-01

## 2018-04-09 NOTE — PROGRESS NOTES
Notified Dominic Becerra [facility] of coumadin being stopped and ASA 325mg daily being started.     Faxed information to [address]

## 2018-04-09 NOTE — PROGRESS NOTES
John Licea is here with her son. She does have some memory issues. We did do a mini mental state exam under she scored a 20. In the recent past the patient has had wide fluctuations in her INR. He does have A. fib and she is on Coumadin.   Son tells me she d

## 2018-04-11 ENCOUNTER — TELEPHONE (OUTPATIENT)
Dept: INTERNAL MEDICINE CLINIC | Facility: CLINIC | Age: 83
End: 2018-04-11

## 2018-04-11 NOTE — TELEPHONE ENCOUNTER
Frederick Roberson reached out to son today regarding activities and additional support for his mother, on the request of [facility] and [facility].   She reports that the son defers to his mother's decision on what she wants and he is currently the focal point of

## 2018-04-11 NOTE — TELEPHONE ENCOUNTER
Torie Hicks reports that she had not received a call from son updating them on recent medication change - the start of ASA 325mg daily and the removal of coumadin. Although they did remove coumadin from med boxes upon our instruction on 4/9.   Torie Hicks reached out

## 2018-05-22 RX ORDER — CARVEDILOL 6.25 MG/1
6.25 TABLET ORAL DAILY
Qty: 90 TABLET | Refills: 3 | Status: SHIPPED | OUTPATIENT
Start: 2018-05-22 | End: 2018-01-01

## 2018-07-30 PROBLEM — S42.201A CLOSED FRACTURE OF RIGHT PROXIMAL HUMERUS: Status: ACTIVE | Noted: 2018-01-01

## 2018-07-30 NOTE — PROGRESS NOTES
This patient fell over the weekend sent to the hospital found to have a fracture proximal humerus. She is in the sling. As a referral to see orthopedic surgery.   Discussed situation with  and with speech pathologist.  Patient does have some

## 2018-07-30 NOTE — ED NOTES
Informed Security/EMT at [facility] of patient condition, including follow up with orthopedic surgeon, and ETA of arrival to [facility].

## 2018-07-30 NOTE — ED PROVIDER NOTES
Patient Seen in: [facility] Emergency Department    History   Patient presents with:  Fall (musculoskeletal, neurologic)    Stated Complaint:     HPI    The patient is a 24-year-old right-hand-dominant elderly female who resides at [facility]   Ht 165.1 cm (5' 5\")   Wt 48.5 kg   SpO2 92%   Breastfeeding? No   BMI 17.81 kg/m²         Physical Exam    General: Alert and oriented in no distress. Neuro: CN II-XII intact.  Grossly normal and symmetric motor strength and sensation proximally shoulder x-ray was obtained. [facility]      XRAY R SHOULDER 1238 AM  IMPRESSION:  Fracture of proximal humerus centerd at or just below the surgical neck with gross medial displacement of the medial fragment. Glenohumeral joint appears intact.   Suggest ortho

## 2018-07-30 NOTE — ED INITIAL ASSESSMENT (HPI)
Pt fell on carpeting when trying to climb into bed. PT did not head head and no LOC. No pain in head or neck per patient, and did not come in on c-collar. PT fell on R arm.

## 2018-07-30 NOTE — TELEPHONE ENCOUNTER
Phone call from Ismael Gonzalez in [facility] - states he received a call from daughter in law who is very upset that they did not receive a call prior to patient being taken to ER early this am.  Apparently according to Ismael Gonzalez, patient has an outside alert system whic

## 2018-08-02 PROBLEM — I50.22 CHRONIC SYSTOLIC CONGESTIVE HEART FAILURE (HCC): Status: ACTIVE | Noted: 2018-01-01

## 2018-08-02 NOTE — PROGRESS NOTES
Patient sustained a recent fracture right humerus. She also is 80 has some memory issues. Had difficulty functioning independently and was subsequently transferred to [facility] unit. Currently she denies any chest pain shortness of breath.   Shoulder pain

## 2018-08-03 PROBLEM — R41.0 DISORIENTATION: Status: ACTIVE | Noted: 2018-01-01

## 2018-08-03 NOTE — PROGRESS NOTES
Right appears more alert today. Do have a psych consult. Urine culture pending. Staff reports no issues with patient.     Physical examination she is pleasant and alert no acute distress she is afebrile vital signs stable abdomen soft nontender no organo

## 2018-08-03 NOTE — TELEPHONE ENCOUNTER
Order for neuro psych social worker requesting    Order written for Dr Ji George and faxed to the [place]

## 2018-08-03 NOTE — TELEPHONE ENCOUNTER
Jelena Ziegler (daughter) called to inform Dr. Dorothy Golden that she took [facility] to have her right shoulder examined after she fell. She states that the patient is going to have surgery tomorrow with Dr. Fabio Wilder at  [facility] to repair the right proximal humerus.   Jelena Ziegler also s

## 2018-08-03 NOTE — PROGRESS NOTES
Problem #2 urinary tract infection. Urinalysis positive.   We will start her on Cipro 250 twice daily for 7 days

## 2018-08-04 PROBLEM — S42.201A CLOSED TRAUMATIC DISPLACED FRACTURE OF PROXIMAL END OF RIGHT HUMERUS: Status: ACTIVE | Noted: 2018-01-01

## 2018-08-04 NOTE — H&P
[facility] Patient Status:  Surgery Admit    3/18/1926 MRN [id number]   [facility] SURGERY Attending Charlet Gowers, MD   Hosp Day # 0 PCP Richelle Cintron MD     Active Problems:    Closed traumatic displaced fra exhibits tenderness, bony tenderness, swelling, deformity and pain. Neurological: She is alert. Skin: Skin is warm and dry. Ecchymosis noted. Psychiatric: She has a normal mood and affect.  Her speech is normal and behavior is normal. Judgment and tho

## 2018-08-04 NOTE — ANESTHESIA POSTPROCEDURE EVALUATION
[facility] Patient Status:  Surgery Admit   Age/Gender 80year old female MRN [id number]   [facility] SURGERY Attending Bijan Banks MD   Hosp Day # 0 PCP Slade Nix MD       Anesthesia Post-op Note    Proce

## 2018-08-04 NOTE — PLAN OF CARE
PT RECD FROM PACU PER BED ,ACC BY FAMILIES. ALERT ,AWAKE, ORIENTED TO SELF. VALERIE WITH SHOULDER IMMOBILIZER ,AQUACEL INTACT. WHOLE ARM  BRUISED AND SWELLING AND DISCOLORED HANDS AND ARM . ABLE TO MOVES FINGERS LITTLE BIT. RADIAL PULSE PALPABLE.  ICE PACK IN PL

## 2018-08-04 NOTE — ANESTHESIA PREPROCEDURE EVALUATION
PRE-OP EVALUATION    Patient Name: Angely Bryan    Pre-op Diagnosis: Other closed displaced fracture of proximal end of right humerus, initial encounter [S42.648A]    Procedure(s):  RIGHT REVERSE TOTAL SHOULDER ARTHROPLASTY    Surgeon(s) and Role:     * N/A      Comment: Procedure: COLONOSCOPY;  Surgeon: Juanita Pascal MD;  Location:  ENDOSCOPY  No date: EYE SURGERY  No date: SPINE SURGERY PROCEDURE UNLISTED      Comment: POTS disease      Smoking status: Never Smoker    Smokeless t

## 2018-08-04 NOTE — OR NURSING
Dr. Cristina Wheatley updated on EKG results and medications. No further orders received. Dr. Swati Schuler notified patient has DNR papers from home. Dr. Swati Schuler spoke with patient and family regarding DNR status while in surgery.

## 2018-08-05 NOTE — PROGRESS NOTES
Pharmacy Note: Renal dose adjustment for Metoclopramide (Reglan)  Clydene Free has been prescribed Metoclopramide (Reglan) 10 mg every 6 hours as needed. Estimated Creatinine Clearance: 22.8 mL/min (A) (based on SCr of 1.18 mg/dL (H)).     Her calcula
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
## 2018-08-05 NOTE — PROGRESS NOTES
[facility] Patient Status:  Inpatient    3/18/1926 MRN [id number]   [facility] 3SW-A Attending Maninder Gutierrez MD   Hosp Day # 1 PCP MD Tez Toledo Annette is a 80year old female patient.     Robina fibrillation, chronic (HCC)    • Cataract     left   • Congestive heart disease ([facility] [address]    • Hearing impairment    • High blood pressure    • Memory changes    • Mitral valve prolapse    • Muscle weakness    • Osteoarthritis    • Peptic ulcer    • Posthemorr

## 2018-08-05 NOTE — PHYSICAL THERAPY NOTE
PHYSICAL THERAPY EVALUATION - INPATIENT     Room Number: 371/371-A  Evaluation Date: 8/5/2018  Type of Evaluation: Initial  Physician Order: PT Eval and Treat    Presenting Problem: s/p reverse TSA  Reason for Therapy: Mobility Dysfunction and Discha UNLISTED      Comment: POTS disease     HOME SITUATION  Type of Home: Independent living facility   Home Layout: One level  Stairs to Enter : 0             Lives With: Alone  Drives: No  Patient Owned Equipment: Rolling walker  Patient Regularly Uses: Glas deficits    RANGE OF MOTION AND STRENGTH ASSESSMENT  Upper extremity ROM and strength are within functional limits except R shoulder in immobilizer and not tested, R wrist/hand WFL, R elbow full AAROM, limited AROM due to pain    Lower extremity ROM is wit assisted shoulder flexion of RUE per reverse TSA guidelines.  Pt performed bed mobility with supervision, sit to stand and toliet transfers with CGA and verbal cues, and ambulated 40 ft initially without AD and holding onto wall for stability with Min A and independent bed mobility, transfers, and gait. The patient is below baseline and would benefit from skilled inpatient PT to address the above deficits to assist patient in returning to prior to level of function.  Recommend ANJELICA upon discharge in attempt to

## 2018-08-05 NOTE — OCCUPATIONAL THERAPY NOTE
OCCUPATIONAL THERAPY EVALUATION - INPATIENT     Room Number: 912/763-X  Evaluation Date: 8/5/2018  Type of Evaluation: Initial  Presenting Problem: fall w/ fx of proximal end of R humerus, s/p R reverse TSA 8/4/18    Physician Order: IP Consult to [employer] height toilet;Grab bar  Shower/Tub and Equipment: Walk-in shower; Shower chair;Grab bar  Other Equipment: None    Occupation/Status: not working  Hand Dominance: Right  Drives: No  Patient Regularly Uses: None    Prior Level of Function: Prior to fa does not wear glasses    PERCEPTION  Left Right Discrimination:   intact    SENSATION  Light touch:  intact    Communication: WFL    Behavioral/Emotional/Social: Pt was motivated and cooperative throughout session.       RANGE OF MOTION AND STRENGTH ASSESSM plan of care. Education on NWB for RUE, immobilizer use for RUE, and Dr. Kavon Kunz protocol. Provided handouts. Made visit back to room in afternoon to get background history once son was present.   Education and assessment of supine to sit trans leisure and social participation. The patient is functioning below her previous functional level and would benefit from skilled inpatient OT to address the above deficits, maximizing patient’s ability to return safely to her prior level of function.

## 2018-08-05 NOTE — PROGRESS NOTES
Post Op Day 1 Ortho Note    Status Post Nerve Block:  Type of Nerve Block: Right humerus fx  Single Injection Nerve Block    Post op review: No evidence of immediate block related complications, No paresthesia noted, Able to lift leg(s), Able to plantar fl

## 2018-08-05 NOTE — CONSULTS
[facility] Hospitalist Initial Consult       Reason for consult: Medical Management sp TSA      History of Present Illness: Patient is a 80year old female with PMH sig for chronic afib, dementia, CVA, HTN  who presents sp TSA.    They tolerated the procedure well anicteric, No conjunctival pallor, EOMs intact. Nose: Nares normal. Septum midline. Mucosa normal. No drainage.    Throat: Lips, mucosa, and tongue normal. Teeth and gums normal.   Neck: Supple, symmetrical, trachea midline, no cervical or supraclavicula participate in the care of this patient.      Leila Christensen MD  [facility] Hospitalist  Pager [phone]

## 2018-08-05 NOTE — OCCUPATIONAL THERAPY NOTE
Attempted to see pt for OT Eval and treat. Pt just began eating breakfast.  Will re-attempt later in day.

## 2018-08-05 NOTE — PROGRESS NOTES
[address] Hospitalist Progress Note                                                                   [facility]  3/18/1926    SUBJECTIVE:  No acute events.   Pain controlled w tylenol and tra [DISCONTINUED] oxyCODONE HCl **OR** [DISCONTINUED] oxyCODONE HCl    Assessment/Plan:  Active Problems:    Closed traumatic displaced fracture of proximal end of right humerus      sp TSA  - for traumatic displaced fracture of prox end rt humerus  - pain co

## 2018-08-06 NOTE — CM/SW NOTE
Spoke with Edison Stephenson with [facility] [address] [phone].  Pt is accepted for rehab if she requires 3 medically necessary midnights in the hospital.    SW met with Colton Lakhani per her request.  She states that pt does have her apt at [facility] but recently moved to

## 2018-08-06 NOTE — PROGRESS NOTES
[facility] Hospitalist Progress Note     PCP: Garrison Kang MD    SUBJECTIVE:  No CP, SOB, or N/V.   Increased pain and swelling in patient's RUE    OBJECTIVE:  Temp:  [97.8 °F (36.6 °C)-98.4 °F (36.9 °C)] 98.3 °F (36.8 °C)  Pulse:  [55-78] 58  Resp:  [16- HYDROcodone-acetaminophen **OR** HYDROcodone-acetaminophen, Zolpidem Tartrate, TraMADol HCl, TiZANidine HCl, morphINE sulfate **OR** morphINE sulfate **OR** morphINE sulfate       Assessment/Plan:     Active Problems:    Closed traumatic displaced fr

## 2018-08-06 NOTE — PHYSICAL THERAPY NOTE
PHYSICAL THERAPY TREATMENT NOTE - INPATIENT    Room Number: 335/274-R     Session: 1   Number of Visits to Meet Established Goals: 5    Presenting Problem: s/p reverse TSA     History related to current admission: Pt is 80year old female admitted on 8/4/ self-stated goal is to go to the bathroom.     OBJECTIVE  Precautions: Bed/chair alarm    WEIGHT BEARING RESTRICTION  Weight Bearing Restriction: R upper extremity  R Upper Extremity: Non-Weight Bearing             PAIN ASSESSMENT   Rating:  (does not rate, agreeable to PT treatment. RUE immobilize in place. Pt completes semi-reclined to sit with CGA with heavy use of bed rail and HOB elevated. Pt sits EOB with supervision.  Seated there-ex for LE's, including LAQ's, ankle pumps and seated marching x 10 B LE's training;Neuromuscular re-educate;Range of motion;Transfer training;Balance training  Rehab Potential : Good  Frequency (Obs): Daily    CURRENT GOALS     Goal #1 Patient is able to demonstrate supine - sit EOB @ level: supervision      Goal #2 Patient is a

## 2018-08-06 NOTE — OPERATIVE REPORT
[address]    PATIENT'S NAME: Aminta Gimenez   ATTENDING PHYSICIAN: Darius Toro M.D. OPERATING PHYSICIAN: Darius Toro M.D.    PATIENT ACCOUNT#:   [de-identified]    LOCATION:  [address] [place] [address]  MEDICAL RECORD #:   [id number]       DATE OF BIRTH:  [birth year] process along the anterior aspect of the right shoulder. Skin and subcutaneous tissues were divided. Hemostasis was obtained. A minimal amount of subcutaneous fat was divided in line with the incision.   A deltoid retractor was positioned as the deltopec gauge, and screws measuring 48 and 48 mm in diameter. They were actually cortical screws that were utilized. I used the locking buttons and placed them over the screws, locked the screws into position.   I then chose a 36 mm glenosphere and impacted it on 03:47:24  t: 08/05/2018 10:16:17  [facility] [id number]  KFW/    cc: Jennifer Lehman M.D.

## 2018-08-06 NOTE — OCCUPATIONAL THERAPY NOTE
OCCUPATIONAL THERAPY TREATMENT NOTE - INPATIENT     Room Number: 440/439-O  Session: 1  Number of Visits to Meet Established Goals: 5    Presenting Problem: fall w/ fx of proximal end of R humerus, s/p R reverse TSA 8/4/18    History related to current adm rate  Location: RUE with movement  Management Techniques: Repositioning;Relaxation     ACTIVITY TOLERANCE  O2 Saturation: 92%  O2 Device: Nasal cannula  Liters of O2:  2  No shortness of breath    ACTIVITIES OF DAILY LIVING ASSESSMENT  AM-PAC ‘6-Clicks’ In fair verbal understanding. Daughter-in-law present throughout. Patient End of Session: Up in chair;Needs met;Call light within reach;RN aware of session/findings; All patient questions and concerns addressed;SCDs in place; Ice applied; Alarm set; Family pre

## 2018-08-06 NOTE — PROGRESS NOTES
Acute Pain Service    Post Op Day 2 Ortho Note    Assessed patient in chair. Patient states Tramadol and [address] is working well to manage pain; denies itching/nausea/dizziness. No further recommendations. Will sign-off.       Plan discussed with/by: Pain

## 2018-08-06 NOTE — DIETARY MALNUTRITION NOTE
[facility]    NUTRITION INITIAL ASSESSMENT    Pt meets moderate malnutrition criteria.     CRITERIA FOR MALNUTRITION DIAGNOSIS:  Criteria for non-severe malnutrition diagnosis: chronic illness related to energy intake less than75% for greater than 1 mo 4.8 oz)  02/13/18 : 49.1 kg (108 lb 3.2 oz)  01/12/18 : 50.1 kg (110 lb 6.4 oz)  10/05/17 : 47.6 kg (104 lb 15 oz)  02/13/17 : 56.6 kg (124 lb 11.2 oz)  12/09/16 : 57.7 kg (127 lb 3.3 oz)  09/15/15 : 65.9 kg (145 lb 4.8 oz)  05/20/15 : 63.5 kg (140 lb)  04

## 2018-08-07 NOTE — DISCHARGE SUMMARY
General Medicine Discharge Summary     Patient ID:  Chhaya Barajas  80year old  3/18/1926    Admit date: 8/4/2018    Discharge date and time: 8/6/18    Attending Physician: Lisa Lopes MD     Primary (cpt=73030)    Result Date: 8/4/2018  PROCEDURE:  XR SHOULDER, COMPLETE (MIN 2 VIEWS), RIGHT (CPT=73030)     TECHNIQUE:  Multiple views were obtained. COMPARISON:  EBONIE , XR SHOULDER, COMPLETE (MIN 2 VIEWS), RIGHT (CPT=73030), 7/30/2018, 0:35.   INDICATI EDWARD , XR CHEST PA + LAT CHEST (CPT=71020), 10/03/2017, 19:50. EDWARD , XR CHEST AP PORTABLE  (CPT=71010), 12/07/2016, 11:40.   INDICATIONS:  hypoxia  PATIENT STATED HISTORY: (As transcribed by Technologist)  Patient offered no additional history at this discharge: Greater than 30 minutes    Lida Smith DO  [facility]

## 2018-08-07 NOTE — CM/SW NOTE
Per RN Demetra Goodpasture pt is cleared by MDs for discharge today. I had spoken with dtr Adria Hardin earlier today and she is aware pt would likely discharge. She requested a family member be able to ride along with pt to facility.     Spoke with Conrad from the [facility]

## 2018-08-07 NOTE — CM/SW NOTE
Call from [address] that they cannot do transport until 630pm due to previously scheduled transports. Updated RN Yady Mora and [address] [facility] with the [place].      Left message for pts dtr Carlene Rivera that transport time is now 2347 Kunal Ochoa RN, CCM  [place] [phone]

## 2018-08-07 NOTE — PHYSICAL THERAPY NOTE
PHYSICAL THERAPY TREATMENT NOTE - INPATIENT    Room Number: 044/292-K     Session: 2   Number of Visits to Meet Established Goals: 5    Presenting Problem: s/p reverse TSA     History related to current admission: Pt is 80year old female admitted on 8/4/ self-stated goal is to go to the bathroom.     OBJECTIVE  Precautions: Bed/chair alarm    WEIGHT BEARING RESTRICTION  Weight Bearing Restriction: R upper extremity  R Upper Extremity: Non-Weight Bearing             PAIN ASSESSMENT   Rating: Unable to rate dizziness. Pt fatigued easily and unable to progress distance due to dizziness. /41. Patient End of Session: Up in chair;Needs met;Call light within reach; All patient questions and concerns addressed;RN aware of session/findings;SCDs in claudio

## 2018-08-08 NOTE — CM/SW NOTE
08/08/18 0800   Discharge disposition   Expected discharge disposition Skilled Nurs   Name of [address] [facility]   Discharge transportation [employer]

## 2018-08-08 NOTE — PROGRESS NOTES
Patient was transferred to the [place] from [facility] following a total right shoulder arthroplasty. Hospital course was complicated by hypoxia thought to be due to atelectasis but this was resolved with normal course of treatment.   Does have histor

## 2018-08-13 NOTE — PROGRESS NOTES
Patient offers no complaints. Did have repair of fracture right shoulder. She is in a sling. Right arm is edematous and ecchymotic. She is able make a full fist radial pulses 1+. Staff states she has been constipated.     Physical examination pleasant

## 2018-08-21 NOTE — PROGRESS NOTES
I was asked see the patient by her mother-in-law. Apparently patient  been complaining of dizziness. She states his been dizzy for a long time but occurs when she gets up. Patient is on a beta-blocker for hypertension.  (Coreg).   Blood pressure today si

## 2018-08-23 NOTE — TELEPHONE ENCOUNTER
[facility], completed and faxed back confirmation received and sent to scan.

## 2018-08-23 NOTE — TELEPHONE ENCOUNTER
Form received from [facility] from the [facility] for delayed cert signed and faxed back conformation received and sent to scan

## 2018-08-29 PROBLEM — Z96.611 S/P REVERSE TOTAL SHOULDER ARTHROPLASTY, RIGHT: Status: ACTIVE | Noted: 2018-01-01

## 2018-09-10 NOTE — PROGRESS NOTES
Staff reports a 3 pound weight loss in 1 week. Patient does complain of some shortness of breath and a cough. No chest pain. Appetite is good. No orthopnea PND.     Physical examination pleasant individual no acute distress normocephalic nonicteric lung

## 2018-09-13 PROBLEM — R53.1 RIGHT SIDED WEAKNESS: Status: ACTIVE | Noted: 2018-01-01

## 2018-09-13 PROBLEM — G45.9 TIA (TRANSIENT ISCHEMIC ATTACK): Status: ACTIVE | Noted: 2018-01-01

## 2018-09-13 NOTE — ED INITIAL ASSESSMENT (HPI)
Right arm and right leg weakness which was noted at 1820. Pt coming from the [facility].

## 2018-09-14 NOTE — ED PROVIDER NOTES
Patient Seen in: [facility] Emergency Department    History   Patient presents with:  Stroke (neurologic)    Stated Complaint:     HPI    80-year-old female presents emergency department complaint of right arm and right leg weakness which is noted a are as noted in HPI. Constitutional and vital signs reviewed. All other systems reviewed and negative except as noted above.     Physical Exam     ED Triage Vitals [09/13/18 1859]   /49   Pulse 62   Resp 14   Temp 97.1 °F (36.2 °C)   Temp src Te other components within normal limits   POCT GLUCOSE - Abnormal; Notable for the following components:    POC Glucose 152 (*)     All other components within normal limits   POCT GLUCOSE - Abnormal; Notable for the following components:    POC Glucose 100 Iv)(vfn=51097)    Result Date: 9/13/2018  CONCLUSION:  No acute intracranial hemorrhage. 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 Chronic appearing infarction in the right cerebellum.   Vascula (transient ischemic attack) G45.9 9/13/2018 Unknown    Right sided weakness R53.1 9/13/2018

## 2018-09-14 NOTE — PLAN OF CARE
GASTROINTESTINAL - ADULT    • Maintains or returns to baseline bowel function Adequate for Discharge        Impaired Functional Mobility    • Achieve highest/safest level of mobility/gait Adequate for Discharge        Impaired Swallowing    • Minimize aspi

## 2018-09-14 NOTE — ED NOTES
Patient arrived back to room. Patient was placed on bedpan. Right sided weakness still present no improvement.

## 2018-09-14 NOTE — PLAN OF CARE
Assumed care at 2300. Pt A/O x2. 2L O2 per NC. Afib on tele. VSS. Neuro checks q2 until 0700, see flow sheets. Neurology consulted. Briefed, incontinent at times. Safety precautions maintained. Bed alarm on. Call light within reach.  Will continue to [facility]

## 2018-09-14 NOTE — PHYSICAL THERAPY NOTE
PHYSICAL THERAPY QUICK EVALUATION - INPATIENT    Room Number: 3815/6149-T  Evaluation Date: 9/14/2018  Presenting Problem: TIA  Physician Order: PT Eval and Treat    Problem List  Principal Problem:    TIA (transient ischemic attack)  Active Problems: staff.    SUBJECTIVE  \"This is not my room. \"    OBJECTIVE  Precautions: Bed/chair alarm  Fall Risk: Standard fall risk    WEIGHT BEARING RESTRICTION  Weight Bearing Restriction: None                PAIN ASSESSMENT  Ratin         RANGE OF MOTION AND S 2lpm. RN made aware. Stand>sit c supervision and cues for eccentric lower. Chair alarm donned.  Pt instructed in PT role, POC, d/c planning, fall risk factors, importance of regular physical activity, negative effects of bedrest/risk for functional decline

## 2018-09-14 NOTE — CM/SW NOTE
Pt is a 79 yo female admitted for TIA. Pt is on CVA protocol. Pt has been at [facility] for [address] since July of this year; however, she will be a long-term resident. Spoke with Tali White at [facility] who confirmed this.   Pt will return to [facility] [place] at d

## 2018-09-14 NOTE — H&P
[facility] HOSPITALIST  History and Physical     [place] Patient Status:  Emergency    3/18/1926 MRN [id number]   Location [facility] Attending Mason Laguerre MD   Hosp Day # 0 PCP Josette Arboleda MD     Chief Complaint: drugs.     Family History:   Family History   Problem Relation Age of Onset   • Heart Disorder Father    • Heart Disorder Mother    • Diabetes Son    • Diabetes Son    • Diabetes Daughter        Allergies:   Sulfa Antibiotics       RASH  Cortisone rhonchi. Cardiovascular: S1, S2. irreg irreg. No murmurs, rubs or gallops. Equal pulses. Chest and Back: No tenderness or deformity. Abdomen: Soft, nontender, nondistended. Positive bowel sounds. No rebound, guarding or organomegaly.   Neurologic: No f

## 2018-09-14 NOTE — PHYSICAL THERAPY NOTE
Patient is currently on bedrest per stroke protocol for 24 hrs from ADT time of 19:07 on 9/13/18. PT will attempt evaluation once activity orders are upgraded.

## 2018-09-14 NOTE — PLAN OF CARE
NURSING DISCHARGE NOTE    Discharged Home via Wheelchair. Accompanied by Support staff  Belongings Taken by patient/family.   Spoke with POA and son [employer] who wanted set up for [address] and was familiar with pricing and did not need a call back with rat

## 2018-09-14 NOTE — OCCUPATIONAL THERAPY NOTE
OCCUPATIONAL THERAPY QUICK EVALUATION - INPATIENT    Room Number: 9614/8476-K  Evaluation Date: 9/14/2018     Type of Evaluation: Quick Eval  Presenting Problem: TIA    Physician Order: IP Consult to Occupational Therapy  Reason for Therapy:  ADL/IADL Dysf PROFILE    HOME SITUATION  Type of Home: Assisted living facility     Lives With: Staff 24 hours    Toilet and Equipment: Comfort height toilet;Grab bar  Shower/Tub and Equipment: Walk-in shower;Grab bar; Shower chair  Other Equipment: (rollator)    Duaen Chisholm taking off regular upper body clothing?: None  -   Taking care of personal grooming such as brushing teeth?: None  -   Eating meals?: None    AM-PAC Score:  Score: 21  Approx Degree of Impairment: 32.79%  Standardized Score (AM-PAC Scale): 44.27  CMS Modif Therapy services. Please re-order if a new functional limitation presents during this admission.     Patient was able to achieve the following goals:  Patient able to toilet transfer: at previous functional level  Patient able to dress lower extremities: a

## 2018-09-14 NOTE — CONSULTS
[facility]    Report of Consultation    Lexi Garcia Patient Status:  Observation    3/18/1926 MRN [id number]   [facility] 7NE-A Attending David Hernandez MD   Hosp Day # 0 PCP Chandrika Christiansen MD     Date of Admission:  20 TOTAL; Right      Comment:  Performed by Fiordaliza García MD at [facility] MAIN OR  No date: SPINE SURGERY PROCEDURE UNLISTED      Comment:  POTS disease   Family History   Problem Relation Age of Onset   • Heart Disorder Father    • Heart Disorder Mother    • Diab infusion, , Intravenous, Continuous    Review of Systems:  A 10-point system was reviewed. Pertinent positives and negatives are noted in HPI.       Physical Exam:  Blood pressure 103/48, pulse 50, temperature 97.7 °F (36.5 °C), temperature source Oral, re weakness     Stroke (cerebrum) (HCC)     Infection due to parainfluenza virus 3     At risk for falling     Melena     Acute blood loss anemia     Diarrhea     Hypokalemia     PUD (peptic ulcer disease)     Pulmonary HTN (HCC)     Mitral valve insufficienc Neurosciences   pager [phone]

## 2018-09-14 NOTE — CM/SW NOTE
Pt may be d/c'd later today. Called Hieu Cornejo at [facility] who said that RN should call the building at [phone] to give report if pt does d/c tonight. [address] [place] [address] placed on will call - RN to call [phone] to coordinate d/c time.   RN to also

## 2018-09-14 NOTE — PROGRESS NOTES
[facility] HOSPITALIST  Progress Note     Tanvir Sanabria Patient Status:  Observation    3/18/1926 MRN [id number]   [facility] 7NE-A Attending Whitney Valentin MD   Hosp Day # 0 PCP Don Abraham MD     Chief Complaint: weakness     S: Lola Freeman Intravenous Daily   • Heparin Sodium (Porcine)  5,000 Units Subcutaneous 2 times per day       ASSESSMENT / PLAN:     1. TIA  2. CORRIE  3. H/o afib   4.  H/o GIB  5. Diastolic HF    Plan of care:   Pt evaluated by Neuro- ASA inhibitor assay pending to determi

## 2018-09-14 NOTE — SLP NOTE
ADULT SWALLOWING EVALUATION    ASSESSMENT    ASSESSMENT/OVERALL IMPRESSION:  Patient seen for swallowing assessment per stroke protocol as she was admitted with right sided weakness. She reports it persists this morning.   She denies any dysarthria or apha denied  Imaging Results:   Brain CT from 9/13/18 revealed:  CONCLUSION:  No acute intracranial hemorrhage. 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      Chronic appearing infarction in the ri for CVA.   In Progress     FOLLOW UP  Treatment Plan/Recommendations: Communication evaluation  Number of Visits to Meet Established Goals: 1  Follow Up Needed: Yes  SLP Follow-up Date: 09/17/18    Thank you for your referral.   If you have any questions, p

## 2018-09-14 NOTE — OCCUPATIONAL THERAPY NOTE
Per stroke protocol and stroke committee recommendation, patient is on bedrest for 24 hrs from ADT time of 19:07 on 9/13. OT will evaluate the patient once activity level is upgraded.

## 2018-09-17 PROBLEM — R53.1 RIGHT SIDED WEAKNESS: Status: RESOLVED | Noted: 2018-01-01 | Resolved: 2018-01-01

## 2018-09-17 NOTE — PROGRESS NOTES
Patient was transferred to the emergency room due to weakness on the right side. There workup indicates she had a TIA. She was subsequently transferred back to the [facility]. Physical examination afebrile VSS normocephalic.   Lungs clear cardiovascular s

## 2018-09-17 NOTE — CM/SW NOTE
09/17/18 0900   Discharge disposition   Expected discharge disposition Skilled Nurs   Name of Hai James   Patient is Discharged to a [address] [place] [address] Yes   Discharge transportation [facility] [facility]

## 2018-09-18 NOTE — DISCHARGE SUMMARY
[facility] HOSPITALIST  DISCHARGE SUMMARY     [facility] Patient Status:  Observation    3/18/1926 MRN [id number]   [facility] 7NE-A Attending No att. providers found   Hosp Day # 0 PCP Mahamed Scruggs MD     Date of Admission: 20 findings and recommendations (brief descriptions):  • As above    Lab/Test results pending at Discharge:   · no    Consultants:  • Neuro    Discharge Medication List:     Discharge Medications      START taking these medications      Instructions Prescript These medications were sent to [facility], [address] [facility] [address] [address] [address] [phone], [phone]  [place] [address] [facility]    Phone:  [phone]   · Clopidogrel Bisulfate 75 MG Tabs

## 2018-10-17 PROBLEM — D64.9 ANEMIA: Status: ACTIVE | Noted: 2018-01-01

## 2018-10-17 PROBLEM — R06.02 SHORTNESS OF BREATH: Status: ACTIVE | Noted: 2018-01-01

## 2018-10-17 PROBLEM — I50.43 ACUTE ON CHRONIC COMBINED SYSTOLIC AND DIASTOLIC CONGESTIVE HEART FAILURE (HCC): Status: ACTIVE | Noted: 2018-01-01

## 2018-10-17 PROBLEM — R79.89 AZOTEMIA: Status: ACTIVE | Noted: 2018-01-01

## 2018-10-17 NOTE — ED PROVIDER NOTES
Patient Seen in: [facility] Emergency Department    History   Patient presents with:  Dyspnea ATILIO SOB (respiratory)  Swelling Edema (cardiovascular, metabolic)    Stated Complaint: ATILIO, LE EDEMA    HPI    Short of breath last 2-3 days- increased leg Performed by Juhi Gray MD at [facility] ENDOSCOPY   • EYE SURGERY     • SHOULDER TOTAL Right 8/4/2018    Performed by Bijan Banks MD at [facility] MAIN OR   • [address] UNLISTED      POTS disease            Social History    Tobacco Use      Smok PEPTIDE - Abnormal; Notable for the following components:    Pro-Beta Natriuretic Peptide 4,894 (*)     All other components within normal limits   PROTHROMBIN TIME (PT) - Abnormal; Notable for the following components:    PT 14.8 (*)     INR 1.11 (*) hemidiaphragm. Probable small bilateral effusions. Interstitial     prominence/scarring is re-identified similar to     the previous study. There is mild vascular congestion/edema.   Multiple     right upper lobe and right lower lobe nodular densitie hypertension I10 5/8/2014 Unknown    Hypoxia R09.02 Unknown Unknown    Shortness of breath R06.02 10/17/2018

## 2018-10-17 NOTE — ED INITIAL ASSESSMENT (HPI)
PATIENT PRESENTS WITH C/O DIFFICULTY BREATHING AND LOWER EXTEMITY EDEMA. SHE STATES SHE ALWAYS HAS SOME DEGREE OF SWELLING, BUT IT IS WORSE. SHE ALSO REPORTS SOME BASELINE DIFFICULT BREATHING, BUT THIS AGAIN IS MUCH WORSE.  SHE IS DYSPNEIC AT REST ON [address]

## 2018-10-17 NOTE — ED NOTES
RECEIVED CALL FROM [facility]. THEY WERE INFORMED THAT PATIENT WILL BE ADMITTED.

## 2018-10-18 NOTE — PROGRESS NOTES
[facility] HOSPITALIST  Progress Note     Fartun Toribio Patient Status:  Observation    3/18/1926 MRN [id number]   [facility] 2NE-A Attending Galina Kilpatrick MD   Hosp Day # 0 PCP Rebbeca Lesches, MD     Chief Complaint: SOB     S: Patie Daily   • furosemide  20 mg Intravenous BID (Diuretic)       ASSESSMENT / PLAN:     1. Acute on chronic diastolic with hypoxia CHF   1. Diurese and wean O2  Lasix/ torsemide given this am   2. Pulm/ cards consult  3. echo  4.  CXR w/ scarring and  Opacities

## 2018-10-18 NOTE — HISTORICAL OFFICE NOTE
Mala Moffit  [phone]  : 1926  ACCOUNT:  [id number]  PCP: Dr. Mast Hy: Raquel Todd:  2015    DISCHARGE SUMMARY    HOSPITAL COURSE: Mrs. Florentino Nicole is an 55-year-old female who lives at [facility] and is follow

## 2018-10-18 NOTE — CONSULTS
[facility] Cardiology  Report of Consultation    Tiffany Poe Patient Status:  Observation    3/18/1926 MRN [id number]   [facility] 2NE-A Attending Jamaal Bowen MD   Hosp Day # 0 PCP Lopez Ha MD     Reason for Consultation: (EGD) N/A 5/15/2015    Performed by Bethany Martinez MD at [facility] ENDOSCOPY   • EYE SURGERY     • SHOULDER TOTAL Right 8/4/2018    Performed by Adelaida Wagner MD at [facility] MAIN OR   • Cristina [address] 54 UNLISTED      POTS disease      Family History   Probl Irregular rhythm, S1, S2 increased, 3/6 apical HSM radiating to axilla. Displaced PMI. + parasternal lift. Lungs: Diminished at bases. No rales. Abdomen: Soft, non-tender. Extremities: Without clubbing, cyanosis. Bilateral LE edema, L>R.   Neurologi ([facility] [address]     Shortness of breath     Hypoxia     Chronic GERD      Exam C/W valvular heart disease with MR and TR, superimposed chronic lung disease and cachexia. Diurese. Will review repeat echo. Chronic a fib.   Recent TIA indicates high risk for recurr

## 2018-10-18 NOTE — PROGRESS NOTES
RECEIVED FROM ER. [address] NO SOB ROOM AIR. O2 SAT-96%AFIB ON TELEMETRY. NOTED SWELLING CHAVO. LOWER EXT. NOTIFIED SON ABOUT THE ADMISSION. FALL PRECATION-WILL PLACE BED ALARM. WILL NOTIFY PRIMARY MD FOR ADMITTING ORDERS.

## 2018-10-18 NOTE — PLAN OF CARE
CARDIOVASCULAR - ADULT    • Maintains optimal cardiac output and hemodynamic stability Progressing    • Absence of cardiac arrhythmias or at baseline Progressing    Controlled a fib on telemetry. VSS  No c/o cardiac symptoms. BLE non pitting edema noted.

## 2018-10-18 NOTE — CONSULTS
Attempt made to see Ana Barba by the Heart Failure Navigator. Spoke with bedside RN Xenia Grier. Patient currently alert x1 and going for ultrasound. Navigators will attempt to follow up with son Melissa Arellano [facility] ).   Will continue to follow  Lakshmi Finnegan RN, BSN

## 2018-10-18 NOTE — H&P
[facility]  History and Physical     Rubytine Fruits Patient Status:  Observation    3/18/1926 MRN [id number]   [facility] 2NE-A Attending Kam Whittaker MD   Hosp Day # 0 PCP Rebbeca Lesches, MD     Chief Complaint: SOB    Hi disease      Social History:  reports that  has never smoked. she has never used smokeless tobacco. She reports that she does not drink alcohol or use drugs.   Family History:   Family History   Problem Relation Age of Onset   • Heart Disorder Father    • H to auscultation bilaterally. No rhonchi. Cardiovascular: S1, S2. Regular rhythm. Regular rate. No murmurs, rubs or gallops. Equal pulses. Chest and Back: No tenderness or deformity. Abdomen: Soft, nontender, nondistended. Positive bowel sounds.  No josé miguel

## 2018-10-18 NOTE — CONSULTS
[facility]  Report of Consultation    Harry John Patient Status:  Observation    3/18/1926 MRN [id number]   [facility] 2NE-A Attending Nat Dias MD   Hosp Day # 0 PCP Sumi Sanchez MD     Reason for Consultation: Problem Relation Age of Onset   • Heart Disorder Father    • Heart Disorder Mother    • Diabetes Son    • Diabetes Son    • Diabetes Daughter       reports that  has never smoked.  she has never used smokeless tobacco. She reports that she does not drink pain  Gastrointestinal: Denies any nausea vomiting or diarrhea she is unaware of any reflux or heartburn  Musculoskeletal: Aware that her lower extremities are swollen  Neurological: Notes that she is forgetful on recent detail     All other review of syst unclear on some recent details    Lab Data Review:  Lab Results   Component Value Date    WBC 6.9 10/17/2018    HGB 10.4 10/17/2018    HCT 34.4 10/17/2018    .0 10/17/2018    CREATSERUM 1.06 10/17/2018    BUN 39 10/17/2018     10/17/2018    K Disorientation     Closed traumatic displaced fracture of proximal end of right humerus;S/P TSA 8/18     S/P reverse total shoulder arthroplasty, right     TIA (transient ischemic attack)     Stenosis of left carotid artery     Anemia     Azotemia     Acut

## 2018-10-18 NOTE — CM/SW NOTE
10/18/18 1500   CM/SW Screening   Referral [address] staff; Chart review;Nursing rounds   Patient's [address] Name [facility]   Discharge Needs   Anticipated D/C needs [place]

## 2018-10-19 NOTE — PROGRESS NOTES
[facility]  Progress Note    Eugene Rivera Patient Status:  Observation    3/18/1926 MRN [id number]   [facility] 2NE-A Attending Sarah Lynn MD   Hosp Day # 0 PCP Pete Haines MD     Subjective:  Eugene Rivera is a(n) 36 injury) ([facility] [address]     Right hand weakness     Infection due to parainfluenza virus 3     At risk for falling     Melena     Acute blood loss anemia     Diarrhea     Hypokalemia     PUD (peptic ulcer disease)     Pulmonary HTN (HCC)     Mitral valve insufficien

## 2018-10-19 NOTE — PLAN OF CARE
Problem: CARDIOVASCULAR - ADULT  Goal: Maintains optimal cardiac output and hemodynamic stability  INTERVENTIONS:  - Monitor vital signs, rhythm, and trends  - Monitor for bleeding, hypotension and signs of decreased cardiac output  - Evaluate effectivenes greater than 92% on 2 L Oxygen per NC, denied any pain. Pt's son Linda Saurez called this nurse and was updated on POC. Pt. Resting in bed, bed alarm on for safety, call light in reach. Report given to night nurse at this time with bedside rounding.

## 2018-10-19 NOTE — CM/SW NOTE
10/19/18 1209   Discharge disposition   Expected discharge disposition Skilled Nurs   Name of Delfino [address] [facility]   Discharge transportation [facility]     Patient discussed in rounds, anticipate d/c today.   Call to and ecin s

## 2018-10-19 NOTE — DIETARY NOTE
Nutrition Short Note   Dietitian consult received per heart failure standing order. Leaving for procedure. RD to follow as able.     Elvia Garg RD, LDN  Pager [phone]

## 2018-10-19 NOTE — PROGRESS NOTES
· [facility] Card[facility] Progress Note     Subjective:  Dyspnea has resolved, left ankle edema improved.     Objective:  139/46  93/57  Afebrile  AFib 60s rare PVC  I/O - 910  BUN/cr 33/1.05    Cardiac: S1 S2 irregular with 3/6 apical holo-systolic murmur

## 2018-10-19 NOTE — PROGRESS NOTES
[facility]  Progress Note     Fartun Toribio Patient Status:  Observation    3/18/1926 MRN [id number]   [facility] 2NE-A Attending Kaykay Lemus MD   Hosp Day # 0 PCP Rebbeca Lesches, MD     Chief Complaint: SOB    S: Patient Daily       ASSESSMENT / PLAN:     1. Acute on chronic diastolic with hypoxia CHF   1. Diurese per cardiology  2. Pulm/ cards consult  3. Echo reviewed  4. Has been wearing O2 prn at [address] recently   2. HTN- BB  3.  Abnormal CXR with possible masses- disc

## 2018-10-20 NOTE — DISCHARGE SUMMARY
[facility]  DISCHARGE SUMMARY     Kiki King Patient Status:  Observation    3/18/1926 MRN [id number]   [facility] 2NE-A Attending No att. providers found   Hosp Day # 0 PCP Nichole Huertas MD     Date of Admission: 10/17/2 requiring use of oxygen. Has been using at the [facility] now for a while. Patient given diuretics   complaints of hypoxia not really changing. Consult with cardiology and pulmonology was obtained.   Echocardiogram was done preserved left ventricular EF but atrium: The atrium was massively dilated. 8.  Tricuspid valve: There was severe, 4+ regurgitation. 9.  Pulmonary arteries: Systolic pressure was moderately to markedly    increased, in the range of 55mm Hg to 60mm Hg.   10. Pericardium, extracardiac: A neal Refills:  0     HYDROcodone-acetaminophen 5-325 MG Tabs  Commonly known as:  NORCO      Take 1 tablet by mouth every 4 (four) hours as needed.    Quantity:  30 tablet  Refills:  0     Pantoprazole Sodium 40 MG Tbec  Commonly known as:  PROTONIX      Take 40 extremities.       -----------------------------------------------------------------------------------------------  PATIENT DISCHARGE INSTRUCTIONS: See electronic chart    Michelle Morocho NP, JOIE  10/19/2018    Time spent:  > 30 minutes

## 2018-10-22 NOTE — PROGRESS NOTES
Patient has no complaints no chest pain shortness of breath orthopnea PND or pedal edema. She was admitted to [facility] for short period of time because of congestive heart failure she was treated with usual methods. Feels much better now.   She den

## 2018-11-09 PROBLEM — D64.9 ANEMIA, UNSPECIFIED TYPE: Status: ACTIVE | Noted: 2018-01-01

## 2018-11-09 NOTE — CONSULTS
[facility]    Report of Cardiology Consultation    [facility] Patient Status:  Emergency    3/18/1926 MRN [id number]   Location [address] [facility] Attending Taniya Joy MD   Hosp Day # 0 PCP Whitney Ye MD     Date Patient is DNR. Patient is a poor historian secondary to her dementia. Patient reports shortness of breath but cannot tell me details of onset. She feels generally weak. There is a little nausea but no vomiting.   Patient reports chronic swelling of h Medications:  Enoxaparin Sodium (LOVENOX) 60 MG/0.6ML injection 50 mg 1 mg/kg Subcutaneous Once       (Not in a hospital admission)    Allergies    Sulfa Antibiotics       RASH  Cortisone                   Review of Systems:   A comprehensive review of sys Thank you for allowing me to participate in the care of your patient.     Pratibha Gomez, [address] [facility] [address] [facility]  Cardiac Electrophysiology  11/9/2018

## 2018-11-09 NOTE — ED PROVIDER NOTES
Patient Seen in: [facility] Emergency Department    History   Patient presents with:  Dyspnea ATILIO SOB (respiratory)    Stated Complaint: ATILIO    HPI    Patient was discharged from the hospital just last month.   She was diagnosed with acute on chronic UNLISTED      POTS disease            Social History    Tobacco Use      Smoking status: Never Smoker      Smokeless tobacco: Never Used    Alcohol use: No    Drug use: No      Review of Systems    Positive for stated complaint: ATILIO  Other systems are as n following components:    Clarity Urine Hazy (*)     Leukocyte Esterase Urine Trace (*)     Bacteria Urine 3+ (*)     Squamous Epi.  Cells Moderate (*)     Hyaline Casts Present (*)     Mucous Urine 1+ (*)     All other components within normal limits   PROT panel showed normal glucose with creatinine 1.2  Troponin negative  Coags INR 1.12  Urinalysis shows negative nitrites with only 1-4 WBCs. No blood  BNP 4774    Chest x-ray  CONCLUSION:    1.  Compared to most recent chest radiograph dated 10/19/2018, harjinder

## 2018-11-09 NOTE — H&P
[facility]  History and Physical     Chrystine Fruits Patient Status:  Emergency    3/18/1926 MRN [id number]   Location [address] [facility] Attending Maria Alejandra Evans MD   Hosp Day # 0 PCP Rebbeca Lesches, MD     Chief Complain has never smoked. she has never used smokeless tobacco. She reports that she does not drink alcohol or use drugs.     Family History:   Family History   Problem Relation Age of Onset   • Heart Disorder Father    • Heart Disorder Mother    • Diabetes Son Respiratory: Diminished (+) crackles  Cardiovascular: S1, S2. Regular (+) murmur  Abdomen: Soft, nontender, nondistended. Positive bowel sounds. No rebound, guarding or organomegaly. Musculoskeletal: Moves all extremities.   Extremities: (+) edema  Inte

## 2018-11-09 NOTE — ED NOTES
Round on pt. No distress noted. Pt on cell phone. Pt alert to self and location. Disoriented to year. No distress noted. Pt on 3LO2, pt sts she always wears oxygen. Pt aware to living at [facility].      Pt updated on poc and eta to CT

## 2018-11-10 NOTE — PROGRESS NOTES
Pending sale to [facility] Note:  Renal Dose Adjustment for Enoxaparin (LOVENOX)    Clau Stratton has been prescribed Enoxaparin (LOVENOX) 1 mg/kg subcutaneously every 12 hours. Estimated Creatinine Clearance: 23.8 mL/min (A) (based on SCr of 1.19 mg/dL (H)).     He

## 2018-11-10 NOTE — PLAN OF CARE
NURSING ADMISSION NOTE      Patient admitted via Cart from ER for CHF exacerbation  Oriented to room. Safety precautions initiated. Bed in low position. Call light in reach.   Confirmed medication with RN at the [facility]  Admission navigator completed

## 2018-11-10 NOTE — CONSULTS
Hematology Initial Consultation Note    Patient Name: Jhonathan Ortiz  Medical Record Number: [id number]    YOB: 1926   Date of Consultation: 11/10/2018   Physician requesting consultation: Dr. Brynn Doll    Reason for Consultation:  [facility] ENDOSCOPY   • COLONOSCOPY N/A 5/18/2015    Performed by Marianna Ang MD at [facility] ENDOSCOPY   • ESOPHAGOGASTRODUODENOSCOPY (EGD) N/A 5/18/2015    Performed by Marianna Ang MD at [facility] ENDOSCOPY   • ESOPHAGOGASTRODUODENOSCOPY (EGD) N/A 5/15/2015 with meals.    Disp:  Rfl:    Pantoprazole Sodium 40 MG Oral Tab EC Take 40 mg by mouth every morning before breakfast. Disp:  Rfl:        Current Inpatient Medications:  Inpatient Meds:  • Potassium Chloride ER  40 mEq Oral Q4H   • Clopidogrel Bisulfate  7 murmurs  Respiratory: Diminished at bases, otherwise clear.     GI/Abd: Soft, non-tender with normoactive bowel sounds, no hepatosplenomegaly  Neurological: Grossly intact   Lymphatics: No palpable lymphadenopathy  Skin: no rashes or petechiae  Ext: symmetr SAPHENOFEMORAL JUNCTION:  No reflux. THROMBI:  Partially occlusive thrombus within the deep veins of the right calf, specifically the peroneal vein. COMPRESSION:  Otherwise normal compressibility.   OTHER:  Negative.   =====  CONCLUSION:  Partially occl

## 2018-11-10 NOTE — PROGRESS NOTES
S: feels ok    O:   Blood pressure 101/43, pulse 60, temperature 98.6 °F (37 °C), temperature source Oral, resp. rate 18, height 5' 6\" (1.676 m), weight 108 lb 11 oz (49.3 kg), SpO2 98 %, not currently breastfeeding.        11/09/18  2111 11/10/18  0100 11 • Pantoprazole Sodium  40 mg Oral QAM AC   • carvedilol  6.25 mg Oral BID with meals   • docusate sodium  100 mg Oral BID   • furosemide  40 mg Intravenous BID (Diuretic)   • enoxaparin  1 mg/kg Subcutaneous Q24H   • mirtazapine  7.5 mg Oral Nightly peroneal veins within the right calf compatible with DVT.   Chest ct neg for PE     Plan:    - Cont Iv lasix   -  DVT is distal, No PE , so AC can be deferred for now   - AF is followed at [facility] and they have determed fall risk and poor AC candidate   - ra

## 2018-11-10 NOTE — PLAN OF CARE
Problem: CARDIOVASCULAR - ADULT  Goal: Maintains optimal cardiac output and hemodynamic stability  INTERVENTIONS:  - Monitor vital signs, rhythm, and trends  - Monitor for bleeding, hypotension and signs of decreased cardiac output  - Evaluate effectivenes supportive blood products/factors, fluids and medications as ordered and appropriate  - Administer supportive blood products/factors as ordered and appropriate  Outcome: Progressing    Goal: Free from bleeding injury  (Example usage: patient with low plate

## 2018-11-10 NOTE — PROGRESS NOTES
[facility]  Progress Note     Vinod Sue Patient Status:  Inpatient    3/18/1926 MRN [id number]   [facility] 8NE-A Attending John Sosa MD   Hosp Day # 1 PCP Dee Posada MD     Chief Complaint: CHF    S: Patient st 6.25 mg Oral BID with meals   • docusate sodium  100 mg Oral BID   • furosemide  40 mg Intravenous BID (Diuretic)   • enoxaparin  1 mg/kg Subcutaneous Q24H   • mirtazapine  7.5 mg Oral Nightly       ASSESSMENT / PLAN:     1.  Shortness of breath d/t acute o

## 2018-11-10 NOTE — PHYSICAL THERAPY NOTE
PHYSICAL THERAPY EVALUATION - INPATIENT     Room Number: 8375/2560-O  Evaluation Date: 11/10/2018  Type of Evaluation: Initial  Physician Order: PT Eval and Treat    Presenting Problem: SOB, Acute on chronic CHF, HTN, DVT R peroneal vein  Reason for level  Stairs to Enter : 0     Stairs to [employer]: 0       Lives With: Staff 24 hours  Drives: No  Patient Owned Equipment: Rolling walker       Prior Level of [place]:  \"They don't let me walk at the [facility]\" Pt with dementia and appears to have been 20   PT Approx Degree of Impairment Score: 35.83%   Standardized Score (AM-PAC Scale): 47.67   CMS Modifier (G-Code): CJ    FUNCTIONAL ABILITY STATUS  Gait Assessment   Gait Assistance: Contact guard assist  Distance (ft): 150  Assistive Device: Rolling wa These impairments and comorbidities manifest themselves as functional limitations in independent bed mobility, transfers, and gait.   The patient is below baseline and would benefit from skilled inpatient PT to address the above deficits to assist patient

## 2018-11-11 NOTE — PROGRESS NOTES
[facility]  Progress Note    Chhaya Barajas Patient Status:  Inpatient    3/18/1926 MRN [id number]   [facility] 8NE-A Attending Amarjit Pride MD   Hosp Day # 2 PCP Do Márquez MD       Assessment and Plan:  Patient Active Pr failure, on oxygen. 9. History of CVA    Would transition to oral diuretics before discharge. Her home dose is torsemide 40 mg daily. She very much wishes to return to the [address] [place] [address] of [address] [place] [address].    She is not an anticoagulation candidate due to h Facility-Administered Medications:  Clopidogrel Bisulfate (PLAVIX) tab 75 mg 75 mg Oral Daily   Pantoprazole Sodium (PROTONIX) EC tab 40 mg 40 mg Oral QAM AC   TraMADol HCl (ULTRAM) tab 50 mg 50 mg Oral Q12H PRN   carvedilol (COREG) tab 6.25 mg 6.25 mg Ora

## 2018-11-11 NOTE — CM/SW NOTE
11/11/18 1400   Discharge disposition   Expected discharge disposition Skilled Nurs   Name of Facillity[facility] Care[facility]ice [facility]   Discharge transportation [facility] to transport to the [place] at 430pm today.   RN to call

## 2018-11-11 NOTE — DISCHARGE SUMMARY
[facility]  DISCHARGE SUMMARY     [facility] Patient Status:  Inpatient    3/18/1926 MRN [id number]   [facility] 8NE-A Attending Peyman Sanchez MD   Hosp Day # 2 PCP Miguel Retana MD     Date of Admission: 2018  Dwight d/t AF because of falls. Per hematology discussion with patient and son, both understand risk of progression without anticoagulation and risk of bleed on anticoagulation. The wish to hold on anticoagulation. Repeat US ordered in 2 weeks.  Lastly, patient wi Chloride ER 10 MEQ Tbcr  Commonly known as:  K-DUR      Take 2 tablets (20 mEq total) by mouth 2 (two) times daily.    Quantity:  130 tablet  Refills:  11     torsemide 20 MG Tabs  Commonly known as:  DEMADEX      Take 2 tablets (40 mg total) by mouth daily

## 2018-11-11 NOTE — PROGRESS NOTES
Hematology Progress Note    Patient Name: [facility]  Medical Record Number: [id number]    YOB: 1926     Reason for Consultation:  [facility] for the diagnosis of DVT    Interval events:  Has been on lovenox.  No bleed 11/11/18   0444   NA  140   --    --   140   --   141   K   --    < >  4.2  3.6  4.9  4.6   CL  103   --    --   104   --   104   CO2  33.0*   --    --   35.0*   --   35.0*   BUN  37*   --    --   33*   --   38*   CREATSERUM  1.19*   --    --   1.06*   -- Partially occlusive thrombus within the proximal aspect of the peroneal veins within the right calf compatible with DVT      Impression & Plan:     *Acute distal right leg DVT- involving peroneal veins  -Previously felt to be a poor candidate for anticoagu

## 2018-11-11 NOTE — PROGRESS NOTES
Pt alert to  Self and place  Needs redirection, chair alarm in place and functioning. Cont on IV lasix  Plan is to dc to the [facility]  Plan of care discussed with pt,   Call light within reach, will cont to monitor    Pt IV was pulled out , pt unaware.  Pre

## 2018-11-11 NOTE — PLAN OF CARE
Alert. Oriented x2. Cooperative and pleasant. Non compliant using call light. Bed alarm on. freq checks done. Anticipate pt's needs. Denies pain, sob. afib per tele. Hr 60's. Up w/ sb. Gait steady. poc discussed with patient. Need reinforcment. Cont. to mon

## 2018-11-11 NOTE — PROGRESS NOTES
[facility]  Progress Note     [facility] Patient Status:  Inpatient    3/18/1926 MRN [id number]   [facility] 8NE-A Attending Peyman Sanchez MD   Hosp Day # 2 PCP Miguel Retana MD     Chief Complaint: CHF    S: Patient de mg/dL (H)). Recent Labs   Lab  11/09/18   1506   PTP  14.9*   INR  1.12*       Recent Labs   Lab  11/09/18   1506   TROP  <0.046            Imaging: Imaging data reviewed in Epic.     Medications:   • Clopidogrel Bisulfate  75 mg Oral Daily   • Pantopraz

## 2018-11-12 NOTE — PLAN OF CARE
NURSING DISCHARGE NOTE    Discharged to [facility]  via [facility]. Accompanied by the paramedics   Belongings taken with    [facility].  Discharge instruction was called and reviewed with [facility] the [facility], all questions answered

## 2018-11-12 NOTE — PLAN OF CARE
CARDIOVASCULAR - ADULT    • Maintains optimal cardiac output and hemodynamic stability Adequate for Discharge    • Absence of cardiac arrhythmias or at baseline Adequate for Discharge        HEMATOLOGIC - ADULT    • Maintains hematologic stability Adequate

## 2018-11-12 NOTE — PROGRESS NOTES
[facility] be [facility] to [facility] with acute on chronic systolic diastolic congestive heart failure. Patient also has A. fib. And was diagnosed having a nonocclusive DVT right peroneal vein.   She was seen in consultation wi

## 2018-11-16 NOTE — PROGRESS NOTES
Was asked to see the patient because of recent change in personality. Becoming much more aggressive and paranoid. Patient does have a history of congestive heart failure is currently being treated for urinary tract infection. She also has memory issues.

## 2019-01-01 ENCOUNTER — TELEPHONE (OUTPATIENT)
Dept: INTERNAL MEDICINE CLINIC | Facility: CLINIC | Age: 84
End: 2019-01-01

## 2023-12-18 NOTE — ED NOTES
Medication: tamsulosin (FLOMAX) 0.4 MG Cap   Last office visit date: 02/09/2023  Medication Refill Protocol Failed. Failed criteria: see below. Sent to clinician to review.      BPH Alpha-Blocker Refill Protocol - 12 Month Protocol [id number]/2023 09:45 AM   Protocol Details Medication (including dose and sig) on current meds list Report given to [facility]

## (undated) DEVICE — TM REVERSE 2.5MM PIN STER: Type: IMPLANTABLE DEVICE | Site: SHOULDER

## (undated) DEVICE — SOL  .9 1000ML BTL

## (undated) DEVICE — 6MM CANNULATED DRILL 15MM

## (undated) DEVICE — KENDALL SCD EXPRESS SLEEVES, KNEE LENGTH, MEDIUM: Brand: KENDALL SCD

## (undated) DEVICE — DRESSING AQUACEL AG 3.5 X 10

## (undated) DEVICE — Device: Brand: STABLECUT®

## (undated) DEVICE — STERILE POLYISOPRENE POWDER-FREE SURGICAL GLOVES: Brand: PROTEXIS

## (undated) DEVICE — FAN SPRAY KIT: Brand: PULSAVAC®

## (undated) DEVICE — INTENDED TO AID IN THE PASSING OF SUTURES THROUGH BONE AND SOFT TISSUE DURING ORTHOPEDIC SURGERY: Brand: HOFFEE SUTURE RETRIEVER

## (undated) DEVICE — SUTURE ETHIBOND 2 OS-4

## (undated) DEVICE — Device

## (undated) DEVICE — 3M™ IOBAN™ 2 ANTIMICROBIAL INCISE DRAPE 6650EZ: Brand: IOBAN™ 2

## (undated) DEVICE — TM REVERSE 2.5MM DRILL STERILE

## (undated) DEVICE — STANDARD HYPODERMIC NEEDLE,POLYPROPYLENE HUB: Brand: MONOJECT

## (undated) DEVICE — 1010 S-DRAPE TOWEL DRAPE 10/BX: Brand: STERI-DRAPE™

## (undated) DEVICE — GLOVE SURG SENSICARE SZ 8-1/2

## (undated) DEVICE — SUTURE VICRYL 0 CP-1

## (undated) DEVICE — Device: Brand: POWER-FLO®

## (undated) DEVICE — CONVERTORS STOCKINETTE: Brand: CONVERTORS

## (undated) DEVICE — SUTURE VICRYL 2-0 CP-1

## (undated) DEVICE — PROXIMATE SKIN STAPLERS (35 WIDE) CONTAINS 35 STAINLESS STEEL STAPLES (FIXED HEAD): Brand: PROXIMATE

## (undated) DEVICE — ORTHO CDS-LF: Brand: MEDLINE INDUSTRIES, INC.

## (undated) DEVICE — 3M™ MICROFOAM™ TAPE 1528-4: Brand: 3M™ MICROFOAM™

## (undated) NOTE — IP AVS SNAPSHOT
[address]            (For Outpatient Use Only) Initial Admit Date: 11/9/2018   Inpt/Obs Admit Date: Inpt: 11/9/18 / Obs: N/A   Discharge Date:    Nelly Casillas:  [de-identified]   MRN: [de-identified]   CSN: [id number]        ENCOUNTER  Patient Subscriber Name:  Faye Lynn :    Subscriber ID:  Pt Rel to Subscriber:    Hospital Account Financial Class: Medicare    2018

## (undated) NOTE — IP AVS SNAPSHOT
[address]            (For Outpatient Use Only) Initial Admit Date: 10/17/2018   Inpt/Obs Admit Date: Inpt: N/A / Obs: 10/17/18   Discharge Date:    Hospital Acct:  [de-identified]   MRN: [de-identified]   CSN: [id number]        [id number]  FWOIF Subscriber ID:  Pt Rel to Subscriber:    Hospital Account Financial Class: Medicare    October 19, 2018

## (undated) NOTE — IP AVS SNAPSHOT
Patient Demographics     Address  [address] [place] [address] Phone  [phone] (Home) *Preferred*  [phone] [facility])      Emergency Contact(s)     Name Relation Home Work Matthew DAMION [phone]    Bonnie Ivey Notes to patient:  Not given while in the hospital      Take 1 tablet by mouth every 4 (four) hours as needed.    John Loza,          Pantoprazole Sodium 40 MG Tbec  Commonly known as:  PROTONIX  Next dose due:  9/15 am      Take 40 mg by mouth every m 1038 Given      [id number] iohexol (OMNIPAQUE) 350 MG/ML injection 75 mL 09/13/18 1937 Given      [id number] torsemide (DEMADEX) tab 20 mg 09/14/18 0816 Given            LEFT LOWER ABDOMEN     Order ID Medication Name Action Time Action Reason Comments    21 Estimated Average Glucose 103 68 - 126 mg/dL — [facility]   Comment:           eAG is the estimated average glucose calculated from Hgb A1c according to the formula recommended by the American Diabetes Association.  eAG levels reflect the long term average Non HDL Chol 40 <130 mg/dL [facility]   Comment:           Desirable  <130 mg/dL   Borderline  130-159 mg/dL   High        160-189 mg/dL       Very high >=190 mg/dL                URINALYSIS WITH CULTURE REFLEX [[id number]]  Resulted: 09/13/18 2008, Resul status: Final result   Ordering provider:  Demetria Sanchez MD  09/13/18 1857 Resulting lab:  [facility]    Specimen Information    Type Source Collected On   Blood — 09/13/18 1859          Components    Component Value Reference Range Flag Lab   Hold Lt Yefri Ramos Globulin  4.2 2.5 - 4.0 g/dL [facility]   A/G Ratio 0.7 1.0 - 2.0  [facility]            CBC WITH DIFFERENTIAL WITH PLATELET [[id number]]  Resulted: 09/13/18 1934, Result status: Final result   Ordering provider:  Kati Nowak MD  09/13/18 1904 Resulting Symptoms now resolved and patient feels fine at time of my exam.  Has no recollection of why she is here. Denies any pain,sob,nausea.     Past Medical History:  Past Medical History:   Diagnosis Date   • Arrhythmia    • Atrial fibrillation, chronic ([facility] [address] docusate sodium 100 MG Oral Cap Take 100 mg by mouth 2 (two) times daily. Disp:  Rfl:    PEG 3350 Oral Powd Pack Take 17 g by mouth daily as needed.  Disp:  Rfl:    Potassium Chloride ER 10 MEQ Oral Tab CR Take 2 tablets (20 mEq total) by mouth 2 (two) time Psychiatric: Appropriate mood and affect.       Diagnostic Data:      Labs:  Recent Labs   Lab  09/13/18   1859   WBC  8.4   HGB  10.7*   MCV  96.3   PLT  218.0       Recent Labs   Lab  09/13/18   1859   GLU  172*   BUN  39*   CREATSERUM  1.49*   GFRAA  35* Arnie Eduardo Patient Status:  Emergency    3/18/1926 MRN [id number]   Location [address] [facility] [address] Attending Kylee Toribio MD   Hosp Day # 0 PCP Martina Branch MD     Chief Complaint: weakness    History of Present Illness: Denisertha Generous Problem Relation Age of Onset   • Heart Disorder Father    • Heart Disorder Mother    • Diabetes Son    • Diabetes Son    • Diabetes Daughter        Allergies:   Sulfa Antibiotics       RASH  Cortisone                   Medications:    No current facility- Cardiovascular: S1, S2. irreg irreg. No murmurs, rubs or gallops. Equal pulses. Chest and Back: No tenderness or deformity. Abdomen: Soft, nontender, nondistended. Positive bowel sounds. No rebound, guarding or organomegaly.   Neurologic: No focal neuro Author:  Marquis Nuno DO Service:  Neurology Author Type:  Physician    Filed:  9/14/2018 11:12 AM Date of Service:  9/14/2018 11:01 AM Status:  Signed    :  Marquis Nuno DO (Physician)         [facility]    Report of Consultati Comment:  Performed by Ana M Alvarez MD at [facility] ENDOSCOPY  5/15/2015: ESOPHAGOGASTRODUODENOSCOPY (EGD); N/A      Comment:  Performed by Eleazar Mendez MD at [facility] ENDOSCOPY  No date: EYE SURGERY  8/4/2018: SHOULDER TOTAL; Right      Comment:  Per •  famoTIDine (PEPCID) tab 20 mg, 20 mg, Oral, Daily **OR** famoTIDine (PEPCID) injection 20 mg, 20 mg, Intravenous, Daily  •  Heparin Sodium (Porcine) 5000 UNIT/ML injection 5,000 Units, 5,000 Units, Subcutaneous, 2 times per day  •  0.9%  NaCl infusion, Assessment  Patient Active Problem List:     Atrial fibrillation (HCC)     Unspecified systolic heart failure     CVA (cerebral infarction)     Community acquired pneumonia     Elevated troponin     CORRIE (acute kidney injury) ([facility] [address]     Right hand weakness lab patient is cleared from a neurologic standpoint, neurology follow up is recommended in 4-6 weeks.     Thank you for allowing me to participate in the evaluation of your patient,    Barbara Martines, DO  Neuromuscular and General Neurology  Nika Kirk • Valvular disease     MVP   • Visual impairment        Past Surgical History  Past Surgical History:  5/18/2015: COLONOSCOPY; N/A      Comment:  Procedure: COLONOSCOPY;  Surgeon: Marianna Ang MD;  Location: [facility] ENDOSCOPY  5/18/2015: -   Turning over in bed (including adjusting bedclothes, sheets and blankets)?: None   -   Sitting down on and standing up from a chair with arms (e.g., wheelchair, bedside commode, etc.): None   -   Moving from lying on back to sitting on the side of the (now off anticogulation due to GI bleed), MVP, POTs disease. Functional outcome measures completed include The AM-PAC '6-Clicks' Inpatient Basic Mobility Short Form was completed and this patient is demonstrating a 29% degree of impairment in mobility.  Renetta Ayala Filed:  9/14/2018  2:46 PM Date of Service:  9/14/2018  2:38 PM Status:  Signed    :  Chanel Soriano OT (Occupational Therapist)       OCCUPATIONAL THERAPY QUICK EVALUATION - INPATIENT    Room Number: 8328/1044-D  Evaluation Date: 9/14/2018 Comment:  Performed by Mustapha Galindo MD at [facility] MAIN OR  No date: SPINE SURGERY PROCEDURE UNLISTED      Comment:  POTS disease     OCCUPATIONAL PROFILE    HOME SITUATION  Type of Home: Assisted living facility     Lives With: Staff 24 hours    Toilet a -   Putting on and taking off regular lower body clothing?: A Little(supervision)   -   Bathing (including washing, rinsing, drying)?: A Little  -   Toileting, which includes using toilet, bedpan or urinal? : A Little(supervision)  -   Putting on and takin Clinical Decision Making[MS. 1]  LOW - Analysis of occupational profile, problem-focused assessments, limited treatment options[MS.2]    Overall Complexity[MS. 1]  LOW[MS.2]     OT Discharge Recommendations: Home(return to assisted living)       PLAN   Patie :  Lucinda Loco (Speech and Language Pathologist)       ADULT SWALLOWING EVALUATION    ASSESSMENT    ASSESSMENT/OVERALL IMPRESSION:  Patient seen for swallowing assessment per stroke protocol as she was admitted with right sided weakness.   She r Patient/Family Goals: none stated    SWALLOWING HISTORY[JL.1]  Current Diet Consistency: Regular; Thin liquids[JL.2]  Dysphagia History: denied  Imaging Results:   Brain CT from 9/13/18 revealed:  CONCLUSION:  No acute intracranial hemorrhage.      Encephalo Esophageal Phase of Swallow: No complaints consistent with possible esophageal involvement[JL.2]              GOALSGoal #1 Patient will participate in cognitive communication assessment if ruled in for CVA.   In Progress     FOLLOW UP[JL.1]  Treatment Plan/

## (undated) NOTE — IP AVS SNAPSHOT
Patient Demographics     Address  [address] [place] [address] Phone  [phone] (Home) *Preferred*  [phone] [facility])      Emergency Contact(s)     Name Relation Home Work [place] DAMION [phone]    Jere Buenrostro ? Not allowed while taking narcotic pain medication or muscle relaxants. Return to work  ? Usually allowed after four to six weeks. Discuss specific work activities with your surgeon. Restrictions/exercises  ?  For shoulder replacement surgery, follow tape. Continue this until you follow up in the office with your surgeon. ? There could be a small amount of redness around the staples or incision; this is normal.  ?  Watch for increased redness, warmth, any odor, increased drainage or opening of t ? Schedule one week after surgery with your PRIMARY CARE PHYSICIAN, review all medications. Sex  ? Check with physician at your office visit. Notify your surgeon if you notice any of the following signs  ? Separation of incision line.   ? Increased red be able to assist with brace placement. The large chest strap goes around your chest, just below the nipple line for men, and under the breasts for women.     #2 Place the chest strap tightly around your chest. Your assistant should be able to place 2-3 fi Next dose due: Tonight, 8/7/18, at bedtime      Take 40 mg by mouth nightly. carvedilol 6.25 MG Tabs  Commonly known as:  COREG  Next dose due: Tomorrow, 8/8/18 in am  Notes to patient:  Take twice a day with meals. Take 6.25 mg by mouth. [id number] carvedilol (COREG) tab 6.25 mg 08/07/18 1012 Given      [id number] carvedilol (COREG) tab 6.25 mg 08/07/18 1754 Given      [id number] magnesium hydroxide (MILK OF MAGNESIA) 400 MG/5ML suspension 30 mL 08/07/18 1023 Given      [id number] torsemide ( .0 150.0 - 450.0 10(3)uL — [facility]   MCV 94.1 81.0 - 100.0 fL — [facility]   MCH 30.2 27.0 - 33.2 pg — [facility]   MCHC 32.1 31.0 - 37.0 g/dL — [facility]   RDW 13.8 11.5 - 16.0 % — [facility]   RDW-SD 47.4 35.1 - 46.3 fL [facility] source Oral, resp. rate 16, height 5' 5\" (1.651 m), weight 104 lb 8 oz (47.4 kg), not currently breastfeeding. HPI    Review of Systems   Constitutional: Negative. HENT: Negative. Eyes: Positive for visual disturbance. Respiratory: Negative. Filed:  8/5/2018 12:12 PM Date of Service:  8/4/2018 12:04 PM Status:  Signed    :  Emmy Moran MD (Physician)     Consult Orders:    1.  Consult to Hospitalist Once [[id number]] ordered by JOIE Estrada at 08/04/18 1205 /53 (BP Location: Left arm)   Pulse 76   Temp 98.6 °F (37 °C) (Oral)   Resp 18   Ht 165.1 cm (5' 5\")   Wt 104 lb 8 oz (47.4 kg)   SpO2 98%   BMI 17.39 kg/m²   General:  Alert, no distress, appears stated age.    Head:  Normocephalic, without obvious - previously on coumadin, held for OR and sig bruising as per conversation w PCP  - monitor on tele  - cont coreg    Ho diastolic CHF  - appears euvolemic  - cont home demadex    CKD4  - cr near baseline        Prevention: SCDs, bowel regimen,  Meagan Yang MD  [address] [facility] [phone]    In 2 weeks          Hospital Course:      sp TSA  - for traumatic displaced fracture of prox end rt humerus  - pain controlled w tylenol/norco  - ANJELICA    Anemia  -hgb relatively Xr Shoulder, Complete (min 2 Views), Right (cpt=73030)    Result Date: 7/30/2018  PROCEDURE:  XR SHOULDER, COMPLETE (MIN 2 VIEWS), RIGHT (CPT=73030)     TECHNIQUE:  Multiple views were obtained. COMPARISON:  None.   INDICATIONS:  fall, shoulder injury  PAT Take 1 tablet by mouth every 4 (four) hours as needed. TraMADol HCl 50 MG Oral Tab  Take 1 tablet (50 mg total) by mouth every 12 (twelve) hours as needed.       CONTINUE these medications which have NOT CHANGED    aspirin 81 MG Oral Tab  Take 81 mg by m PHYSICAL THERAPY TREATMENT NOTE - INPATIENT    Room Number: 793/278-M     Session:[BR.1] 2[BR.2]   Number of Visits to Meet Established Goals: 5    Presenting Problem: s/p reverse TSA[BR.3]     History related to current admission: Pt is 80year old femal SUBJECTIVE  \"I[BR.1] don't remember where I live. \"[BR.2]    Patient’s self-stated goal is to go to the bathroom.     OBJECTIVE[BR.1]  Precautions: Bed/chair alarm[BR.3]    WEIGHT BEARING RESTRICTION[BR.1]  Weight Bearing Restriction: R upper extremity  R sequence. Pt reported of mod SOB, SPO2 on 2 L supplement O2. Pt performed SPT from bed>commode>bed with cga. Cues for safety. Pt ambulated with [facility] AND Topmost 20' x 2 with one seated rest due to dizziness.  Pt fatigued easily and unable to progress distance due to Goal #5     Goal #6     Goal Comments: Goals established on 8/5/2018, goals ongoing 8/[BR.1]7[BR.2]/18[BR.1]     Attribution Alvarez    BR. 1 - Giselle Jiang, PTA on 8/7/2018  9:53 AM  BR. 2 - Giselle Jiang, PTA on 8/7/2018  3:06 PM  BR. 3 - DEEP Jiang • High blood pressure    • Memory changes    • Mitral valve prolapse    • Muscle weakness    • Osteoarthritis    • Peptic ulcer    • Posthemorrhagic anemia    • Stroke [facility])    • Unspecified essential hypertension    • Valvular disease     MVP   • Visual i -   Moving to and from a bed to a chair (including a wheelchair)?: A Little   -   Need to walk in hospital room?: A Little   -   Climbing 3-5 steps with a railing?: A Little       AM-PAC Score:  Raw Score: 18   PT Approx Degree of Impairment Score: 46.58% balance with use of quad cane over straight cane. Pt participates well with treatment session with frequent reassurance.  Pt will continue to benefit from IP skilled PT to address deficits of dec static and dynamic standing balance, dec strength, dec activi History related to current admission: Pt is 80year old female admitted on 8/4/2018 from independent living facility with c/o R shoulder pain post fall. Pt diagnosed with displaced proximal humerus fracture[AJ.1] and was discharged home[AJ.2]. [AJ.1] Pt ha Patient Regularly Uses: Glasses    Prior Level of [place]:[AJ.1] Pt used a rollator and ambulated Mod I in the ILF (since 2009) and community. Pt held onto furniture around unit. Lived with  until he passed in 2013.  Pt was Mod I in dressing, to R shoulder in immobilizer and not tested, R wrist/hand WFL, R elbow full AAROM, limited AROM due to pain[AJ.2]    Lower extremity ROM is within functional limits     Lower extremity strength is within functional limits     BALANCE  Static Sitting: Fair + TSA guidelines.  Pt performed bed mobility with supervision, sit to stand and toliet transfers with CGA and verbal cues, and ambulated 40 ft initially without AD and holding onto wall for stability with Min A and then use of cane (Min A, cueing for sequenci These impairments and comorbidities manifest themselves as functional limitations in independent bed mobility, transfers, and gait.   The patient is below baseline and would benefit from skilled inpatient PT to address the above deficits to assist patient i Filed:  8/6/2018  3:40 PM Date of Service:  8/6/2018  3:05 PM Status:  Signed    :  Rusty Ley OT (Occupational Therapist)       OCCUPATIONAL THERAPY TREATMENT NOTE - INPATIENT     Room Number: 608/344-C  Session: 1[BW.1]  Number of Visits Precautions: Bed/chair alarm[BW.2]    WEIGHT BEARING RESTRICTION[BW.1]  Weight Bearing Restriction: R upper extremity  R Upper Extremity: Non-Weight Bearing[BW.2]             PAIN ASSESSMENT[BW.1]  Rating: Unable to rate  Location: RUE with movement  Manag hand placement and NWB RUE; toileting including pericare in sitting SBA, brief management CGA for balance in standing; several steps back to bedside chair via min hand-held assist and safety cues.  Patient also educated on OT role, safety, fall prevention, BW.1 - Amadeo Stephenson OT on 8/6/2018  3:12 PM  BW.2 - Amadeo Stephenson OT on 8/6/2018  3:13 PM  BW.3 - Amadeo Stephenson OT on 8/6/2018  3:35 PM               Occupational Therapy Note signed by David Arellano OT at 8/5/2018  2:42 PM  Version 1 of Past Surgical History  Past Surgical History:  5/18/2015: COLONOSCOPY N/A      Comment: Procedure: COLONOSCOPY;  Surgeon: Madhavi Rachel MD;  Location: [facility] ENDOSCOPY  No date: EYE SURGERY  No date: [address] Management Techniques: Repositioning;Relaxation    COGNITION  Orientation Level:  oriented to person, disoriented to place and disoriented to time  Memory:  decreased recall of precautions, decreased recall of recent events and decreased short term memory -   Toileting, which includes using toilet, bedpan or urinal? : A Little  -   Putting on and taking off regular upper body clothing?: A Lot  -   Taking care of personal grooming such as brushing teeth?: A Lot  -   Eating meals?: A Little    AM-PAC Score: indicating SNF/IRF)[MR.3].  In this OT evaluation patient presents with the following performance deficits:[MR.1] decreased stand tolerance, decreased dynamic stand balance, decreased endurance, increased pain in R shoulder, decreased AROM in R shoulder, an education; Compensatory technique education;Equipment eval/education  Rehab Potential : Good  Frequency (Obs): 5x/week  Number of Visits to Meet Established Goals: 5[MR.1]    ADL Goals  Pt will perform grooming in stand w/ supervision   Pt will perform LB d

## (undated) NOTE — IP AVS SNAPSHOT
[address]            (For Outpatient Use Only) Initial Admit Date: 9/13/2018   Inpt/Obs Admit Date: Inpt: N/A / Obs: 09/13/18   Discharge Date:    Monica Leung:  [de-identified]   MRN: [de-identified]   CSN: [id number]        [facility] Subscriber Name:  Faye Lynn :    Subscriber ID:  Pt Rel to Subscriber:    Hospital Account Financial Class: Medicare    2018

## (undated) NOTE — LETTER
[facility]  Corinne Schusterdella [address] [facility], [address] [place] [address]    Consent for Operation    Date: __________________    Time: _______________    1.  I authorize the performance upon [employer]ruits the following operation:    Procedure(s):  RIGHT REVERSE TOTAL procedure has been videotaped, the surgeon will obtain the original videotape. The hospital will not be responsible for storage or maintenance of this tape.     6. For the purpose of advancing medical education, I consent to the admittance of observers to t STATEMENTS REQUIRING INSERTION OR COMPLETION WERE FILLED IN.     Signature of Patient:   ___________________________    When the patient is a minor or mentally incompetent to give consent:  Signature of person authorized to consent for patient: ____________ drugs/illegal medications). Failure to inform my anesthesiologist about these medicines may increase my risk of anesthetic complications. · If I am allergic to anything or have had a reaction to anesthesia before.     3. I understand how the anesthesia med I have discussed the procedure and information above with the patient (or patient’s representative) and answered their questions. The patient or their representative has agreed to have anesthesia services.     _______________________________________________

## (undated) NOTE — IP AVS SNAPSHOT
Patient Demographics     Address  [address] [place] [address] Phone  [phone] (Home) *Preferred*  [phone] [facility])      Emergency Contact(s)     Name Relation Home Work Matthew BRUNO [phone]    Eugene Chau 3. Limit your fluid intake to no more than 2 liters or 64 ounces per day    4. Some exercise and activity is important to help keep your heart functioning and strong.  Unless instructed not to exercise, you may walk at a slow to moderate pace for 10-15 keanu TAKE these medications       Instructions Authorizing Provider Morning Afternoon Evening As Needed   atorvastatin 40 MG Tabs  Commonly known as:  LIPITOR  Next dose due:  10/19 9 p.m. Take 40 mg by mouth nightly.           carvedilol 6.25 MG Tabs  C ** SITE UNKNOWN **     Order ID Medication Name Action Time Action Reason Comments    [id number] Clopidogrel Bisulfate (PLAVIX) tab 75 mg 10/19/18 0812 Given      [id number] Pantoprazole Sodium (PROTONIX) EC tab 40 mg 10/19/18 0624 Given      [id number] Huang Calcium, Total 8.8 8.3 - 10.3 mg/dL [facility]   Calculated Osmolality 303 275 - 295 mOsm/kg [facility]   GFR, Non- 46 >=60  [facility]   GFR, -American 53 >=60  [employer]   Comment:           Estimated GFR units: mL/min/1.73 sq • Memory changes    • Mitral valve prolapse    • Muscle weakness    • Osteoarthritis    • Peptic ulcer    • Posthemorrhagic anemia    • Stroke [facility])    • Unspecified essential hypertension    • Valvular disease     MVP   • Visual impairment       Past Surg (twelve) hours as needed. Disp: 60 tablet Rfl: 0   atorvastatin 40 MG Oral Tab Take 40 mg by mouth nightly. Disp:  Rfl:    carvedilol 6.25 MG Oral Tab Take 6.25 mg by mouth 2 (two) times daily with meals.    Disp:  Rfl:    Pantoprazole Sodium 40 MG Oral Tab ASSESSMENT / PLAN:   1. Acute on chronic diastolic with hypoxia CHF   1. Diurese and wean O2  2. HTN- BB  3. Old CVA- plavix   4. Chronic Afib-not on anticoagulation for fall risk. Medications as above  5. DL- statin   6.  GERD- PPI    Observe and hope to shortness of breath, leg edema and increasing O2 requirements. She denies any discomfort at this time. Her only complaint is she cannot locate her TV remote.     History:  Past Medical History:   Diagnosis Date   • Arrhythmia    • Atrial fibrillation, chr •  Pantoprazole Sodium (PROTONIX) EC tab 40 mg, 40 mg, Oral, QAM AC  •  Potassium Chloride ER (K-DUR M20) CR tab 20 mEq, 20 mEq, Oral, BID  •  torsemide (DEMADEX) tab 20 mg, 20 mg, Oral, Daily  •  TraMADol HCl (ULTRAM) tab 50 mg, 50 mg, Oral, Q12H PRN  • CORRIE (acute kidney injury) ([facility]     Right hand weakness     Infection due to parainfluenza virus 3     At risk for falling     Melena     Acute blood loss anemia     Diarrhea     Hypokalemia     PUD (peptic ulcer disease)     Pulmonary HTN ([facility]     Khai PROCEDURE:  XR CHEST PA + LAT CHEST (CPT=71046)  INDICATIONS:  Followup pulmonary nodules  COMPARISON:  [facility] , XR CHEST AP PORTABLE  (CPT=71045), 10/17/2018, 14:42. TECHNIQUE:  PA and lateral chest radiographs were obtained.   PATIENT STATED HISTORY: (As ---------------------------------------------------------------------------- History/Indications:   Dyspnea.  ---------------------------------------------------------------------------- Procedure information:  A transthoracic complete 2D study was performe Pulmonary arteries: Systolic pressure was moderately to markedly     increased, in the range of 55mm Hg to 60mm Hg. 10. Pericardium, extracardiac: A small pericardial effusion was identified     posterior to the heart.  There was no evidence of hemodynamic Pulmonic valve:    Structurally normal valve. Normal thickness leaflets. Cusp separation was normal.  Doppler:  Transvalvular velocity was within the normal range. There was mild regurgitation.  Pericardium:  A small pericardial effusion was identified post ---------  Aortic valve VTI, S                             65.8  cm       ---------  Aortic mean gradient, S                         15    mm Hg    ---------  Aortic peak gradient, S                         28    mm Hg    ---------  Aortic valve area, VTI Reference  RV pressure, S, DP                              67    mm Hg    ---------   Pulmonic valve                                  Value          Reference  Pulmonic regurg velocity, ED                    1.41  m/sec    ---------  Pulmonic regurg gradie

## (undated) NOTE — IP AVS SNAPSHOT
[address]            (For Outpatient Use Only) Initial Admit Date: 8/4/2018   Inpt/Obs Admit Date: Inpt: 8/4/18 / Obs: N/A   Discharge Date:    Koleen Captain:  [de-identified]   MRN: [de-identified]   CSN: [id number]        ENCOUNTER  Patient C Group Number:  Insurance Type:    Subscriber Name:  Subscriber :    Subscriber ID:  Pt Rel to Subscriber:    Hospital Account Financial Class: Medicare    2018

## (undated) NOTE — ED AVS SNAPSHOT
Ms. [facility]   MRN: [id number]    Department:  [facility] Emergency Department   Date of Visit:  7/30/2018           Disclosure     Insurance plans vary and the physician(s) referred by the ER may not be covered by your plan.  Please contact tell this physician (or your personal doctor if your instructions are to return to your personal doctor) about any new or lasting problems. The primary care or specialist physician will see patients referred from the [facility] Emergency Department.  Emmanuel Feldman

## (undated) NOTE — IP AVS SNAPSHOT
Patient Demographics     Address  [address] [place] [id number] Phone  [phone] (Home) *Preferred*  [phone] [facility])      Emergency Contact(s)     Name Relation Home Work Matthew DAMION [phone]    [place] Michel Next dose due: Today 11/11/2018      Take 7.5 mg by mouth nightly. Pantoprazole Sodium 40 MG Tbec  Commonly known as:  PROTONIX  Next dose due:   Today 11/11/2018      Take 40 mg by mouth every morning before breakfast.          PEG 0204 Pack  Com Recent Vital Signs       Most Recent Value   Vitals  130/75 Filed at 11/11/2018 1200   Pulse  60 Filed at 11/11/2018 1200   Resp  20 Filed at 11/11/2018 0342   Temp  97.6 °F (36.4 °C) Filed at 11/11/2018 1200   SpO2  100 % Filed at 11/11/2018 1200      Pat CBC W/ DIFFERENTIAL[[id number]]          Abnormal            Final result                 Please view results for these tests on the individual orders.     Specimen Information    Type Source Collected On   Blood — 11/11/18 0444            VITAMIN B12 WITH R Specimen Information    Type Source Collected On   Blood — 11/09/18 1618          Components    Component Value Reference Range Flag Lab   Vitamin B12 730 193 - 986 pg/mL — Nakul Holley   Comment:         Vitamin B12 Reference Range      Deficient:      <14 person who is consuming high dose of biotin supplements resulting in biotin serum concentrations >100 ng/mL.  It is recommended that physicians ask all patients who may be on biotin supplementation to stop biotin consumption at least 72 hours prior to colle Collected:  11/09/18 1457    Order Status:  Completed Lab Status:  Final result Updated:  11/11/18 0918    Specimen:  Urine, clean catch      Urine Culture 50,000-99,000 CFU/ML Escherichia coli    Susceptibility      Escherichia coli     Not Specified    A • High blood pressure    • Memory changes    • Mitral valve prolapse    • Muscle weakness    • Osteoarthritis    • Peptic ulcer    • Posthemorrhagic anemia    • Stroke [facility])    • Unspecified essential hypertension    • Valvular disease     MVP   • Visual i Clopidogrel Bisulfate 75 MG Oral Tab Take 1 tablet (75 mg total) by mouth daily. Disp: 30 tablet Rfl: 1   docusate sodium 100 MG Oral Cap Take 100 mg by mouth 2 (two) times daily. Disp:  Rfl:    PEG 3350 Oral Powd Pack Take 17 g by mouth daily as needed.  D Estimated Creatinine Clearance: 24.3 mL/min (A) (based on SCr of 1.19 mg/dL (H)). Recent Labs   Lab  11/09/18   1506   PTP  14.9*   INR  1.12*       Recent Labs   Lab  11/09/18   1506   TROP  <0.046[PT.2]       Imaging: Imaging data reviewed in Epic. Physician requesting consultation:[MS.1] [MS.2] Jimy Orona[MS. 3]    Reason for Consultation:  Lexi Garcia was seen today for the diagnosis of[MS. 1] DVT[MS.2]    History of Present Illness:  80year old female with history of CHF, afib, cerebrovas • ESOPHAGOGASTRODUODENOSCOPY (EGD) N/A 5/18/2015    Performed by Luis Pena MD at [facility] ENDOSCOPY   • ESOPHAGOGASTRODUODENOSCOPY (EGD) N/A 5/15/2015    Performed by Zoe Bartholomew MD at [place] [id number] before breakfast. Disp:  Rfl:        Current Inpatient Medications:  Inpatient Meds:  • Potassium Chloride ER  40 mEq Oral Q4H   • Clopidogrel Bisulfate  75 mg Oral Daily   • Pantoprazole Sodium  40 mg Oral QAM AC   • carvedilol  6.25 mg Oral BID with meal GI/Abd: Soft, non-tender with normoactive bowel sounds, no hepatosplenomegaly  Neurological: Grossly intact   Lymphatics: No palpable lymphadenopathy  Skin: no rashes or petechiae[MS.1]  Ext: symmetric 2+ BLE edema, no tenderness or erythema[MS.4]    Labor THROMBI:  Partially occlusive thrombus within the deep veins of the right calf, specifically the peroneal vein. COMPRESSION:  Otherwise normal compressibility.   OTHER:  Negative.   =====  CONCLUSION:  Partially occlusive thrombus within the proximal aspec Filed:  11/11/2018  2:10 PM Date of Service:  11/11/2018  2:06 PM Status:  Addendum    :  Jose Nj MD (Physician)    Related Notes:  Original Note by Jose Nj MD (Physician) filed at 11/11/2018  2:10 PM       [facility]  Via Lorrie Farooq of breath. She was admitted for heart failure. Diuretics initiated with clinical improvement. Patient underwent lower ext dopplers which revealed non-occlusive DVT. Hematology consulted.  Patient has not been on anticoagulation for stroke prevention d/t AF Commonly known as:  COREG      Take 6.25 mg by mouth 2 (two) times daily with meals. Refills:  0     Clopidogrel Bisulfate 75 MG Tabs  Commonly known as:  PLAVIX      Take 1 tablet (75 mg total) by mouth daily.    Quantity:  30 tablet  Refills:  1     Pot Date of Discharge: 11/11/2018  Discharge Disposition: SNF    Admitting Diagnosis:   Shortness of breath [R06.02]  Acute on chronic combined systolic and diastolic congestive heart failure ([facility] [I50.43]  Anemia, unspecified type [D64.9]    [address] [place] [address] to [facility] today.     Discharge Medication List:     Discharge Medications      START taking these medications      Instructions Prescription details   cephALEXin 500 MG Caps  Commonly known as:  KEFLEX      Take 1 capsule (500 mg total) by mouth 4 (four) times Where to Get Your Medications      Please  your prescriptions at the location directed by your doctor or nurse    Bring a paper prescription for each of these medications  · cephALEXin 500 MG Caps  · TraMADol HCl 50 MG Tabs         [place] reviewed[place]  Danielle Verma Result Date: 10/18/2018                                                     *Td 3  One [place] and pressure overload. 3.  Aortic valve: Mildly calcified annulus. Trileaflet; mildly to moderately     thickened, mildly calcified leaflets. Cusp separation was mildly     reduced. Transvalvular velocity was increased. There was mild stenosis.      Trivial calcified annulus. Mitral valve demonstrates mildly thickened, mildly calcified leaflets. Doppler: There was no evidence for stenosis. There was moderate to severe, 3-4+ regurgitation. Aortic valve:   Mildly calcified annulus.  Trileaflet; mildly to mo >=55  Stroke volume, 2D                               73    ml       ---------  Stroke volume/bsa, 2D                           47    ml/m^2   ---------   Ventricular septum                              Value          Reference  IVS thickness, ED ---------   Pulmonary arteries                              Value          Reference  PA pressure, S, DP                              67    mm Hg    ---------  PA pressure, ED, DP                             23    mm Hg    ---------   Tricuspid valve Physician Order: PT Eval and Treat    Presenting Problem: SOB, Acute on chronic CHF, HTN, DVT R peroneal vein[KT. 2]  Reason for Therapy: Mobility Dysfunction and Discharge Planning    PMH: h/o CVA, Chronic A-fib, anemia, dementia, tricuspid/mitral valve re Drives: No  Patient Owned Equipment: Rolling walker[KT. 2]       Prior Level of [place]:  \"They don't let me walk at the [facility]\" Pt with dementia and appears to have been ambulating with a RW    SUBJECTIVE  \"I'm not allowed to walk\"    Patient self Raw Score: 20   PT Approx Degree of Impairment Score: 35.83%   Standardized Score (AM-PAC Scale): 47.67   CMS Modifier (G-Code): CJ[KT. 2]    FUNCTIONAL ABILITY STATUS  Gait Assessment[KT. 1]   Gait Assistance: Contact guard assist  Distance (ft): [address] [place] and was not able to tell me the year. Able to ambulated with cga and RW x 150 ft. Adequate strength for ambulation. These impairments and comorbidities manifest themselves as functional limitations in independent bed mobility, transfers, and gait.   The ricky